# Patient Record
Sex: FEMALE | Race: WHITE | NOT HISPANIC OR LATINO | Employment: OTHER | URBAN - METROPOLITAN AREA
[De-identification: names, ages, dates, MRNs, and addresses within clinical notes are randomized per-mention and may not be internally consistent; named-entity substitution may affect disease eponyms.]

---

## 2021-07-17 ENCOUNTER — APPOINTMENT (OUTPATIENT)
Dept: RADIOLOGY | Facility: MEDICAL CENTER | Age: 66
End: 2021-07-17
Attending: EMERGENCY MEDICINE
Payer: MEDICARE

## 2021-07-17 ENCOUNTER — HOSPITAL ENCOUNTER (OUTPATIENT)
Facility: MEDICAL CENTER | Age: 66
End: 2021-07-19
Attending: EMERGENCY MEDICINE | Admitting: INTERNAL MEDICINE
Payer: MEDICARE

## 2021-07-17 ENCOUNTER — APPOINTMENT (OUTPATIENT)
Dept: RADIOLOGY | Facility: MEDICAL CENTER | Age: 66
End: 2021-07-17
Attending: INTERNAL MEDICINE
Payer: MEDICARE

## 2021-07-17 DIAGNOSIS — I21.4 NSTEMI (NON-ST ELEVATED MYOCARDIAL INFARCTION) (HCC): ICD-10-CM

## 2021-07-17 DIAGNOSIS — R07.9 CHEST PAIN, UNSPECIFIED TYPE: ICD-10-CM

## 2021-07-17 DIAGNOSIS — R79.89 ELEVATED TROPONIN: ICD-10-CM

## 2021-07-17 PROBLEM — I10 HYPERTENSION: Status: ACTIVE | Noted: 2021-07-17

## 2021-07-17 PROBLEM — E11.9 DM (DIABETES MELLITUS) (HCC): Status: ACTIVE | Noted: 2021-07-17

## 2021-07-17 PROBLEM — E03.9 HYPOTHYROIDISM: Status: ACTIVE | Noted: 2021-07-17

## 2021-07-17 LAB
ALBUMIN SERPL BCP-MCNC: 4 G/DL (ref 3.2–4.9)
ALBUMIN/GLOB SERPL: 1.6 G/DL
ALP SERPL-CCNC: 48 U/L (ref 30–99)
ALT SERPL-CCNC: 37 U/L (ref 2–50)
ANION GAP SERPL CALC-SCNC: 15 MMOL/L (ref 7–16)
APTT PPP: 41.8 SEC (ref 24.7–36)
APTT PPP: 46.3 SEC (ref 24.7–36)
AST SERPL-CCNC: 40 U/L (ref 12–45)
BASOPHILS # BLD AUTO: 0.4 % (ref 0–1.8)
BASOPHILS # BLD: 0.03 K/UL (ref 0–0.12)
BILIRUB SERPL-MCNC: 0.3 MG/DL (ref 0.1–1.5)
BUN SERPL-MCNC: 15 MG/DL (ref 8–22)
CALCIUM SERPL-MCNC: 9.4 MG/DL (ref 8.5–10.5)
CHLORIDE SERPL-SCNC: 107 MMOL/L (ref 96–112)
CO2 SERPL-SCNC: 19 MMOL/L (ref 20–33)
CREAT SERPL-MCNC: 0.48 MG/DL (ref 0.5–1.4)
EKG IMPRESSION: NORMAL
EOSINOPHIL # BLD AUTO: 0.22 K/UL (ref 0–0.51)
EOSINOPHIL NFR BLD: 3.1 % (ref 0–6.9)
ERYTHROCYTE [DISTWIDTH] IN BLOOD BY AUTOMATED COUNT: 40.4 FL (ref 35.9–50)
GLOBULIN SER CALC-MCNC: 2.5 G/DL (ref 1.9–3.5)
GLUCOSE BLD-MCNC: 121 MG/DL (ref 65–99)
GLUCOSE BLD-MCNC: 318 MG/DL (ref 65–99)
GLUCOSE SERPL-MCNC: 157 MG/DL (ref 65–99)
HCT VFR BLD AUTO: 40 % (ref 37–47)
HGB BLD-MCNC: 13.8 G/DL (ref 12–16)
IMM GRANULOCYTES # BLD AUTO: 0.02 K/UL (ref 0–0.11)
IMM GRANULOCYTES NFR BLD AUTO: 0.3 % (ref 0–0.9)
INR PPP: 0.96 (ref 0.87–1.13)
INR PPP: 1.01 (ref 0.87–1.13)
LYMPHOCYTES # BLD AUTO: 3.13 K/UL (ref 1–4.8)
LYMPHOCYTES NFR BLD: 43.6 % (ref 22–41)
MAGNESIUM SERPL-MCNC: 1.7 MG/DL (ref 1.5–2.5)
MCH RBC QN AUTO: 30.8 PG (ref 27–33)
MCHC RBC AUTO-ENTMCNC: 34.5 G/DL (ref 33.6–35)
MCV RBC AUTO: 89.3 FL (ref 81.4–97.8)
MONOCYTES # BLD AUTO: 0.46 K/UL (ref 0–0.85)
MONOCYTES NFR BLD AUTO: 6.4 % (ref 0–13.4)
NEUTROPHILS # BLD AUTO: 3.32 K/UL (ref 2–7.15)
NEUTROPHILS NFR BLD: 46.2 % (ref 44–72)
NRBC # BLD AUTO: 0 K/UL
NRBC BLD-RTO: 0 /100 WBC
PLATELET # BLD AUTO: 213 K/UL (ref 164–446)
PMV BLD AUTO: 10.6 FL (ref 9–12.9)
POTASSIUM SERPL-SCNC: 3.8 MMOL/L (ref 3.6–5.5)
PROT SERPL-MCNC: 6.5 G/DL (ref 6–8.2)
PROTHROMBIN TIME: 12.5 SEC (ref 12–14.6)
PROTHROMBIN TIME: 13 SEC (ref 12–14.6)
RBC # BLD AUTO: 4.48 M/UL (ref 4.2–5.4)
SODIUM SERPL-SCNC: 141 MMOL/L (ref 135–145)
TROPONIN T SERPL-MCNC: 38 NG/L (ref 6–19)
TROPONIN T SERPL-MCNC: 41 NG/L (ref 6–19)
UFH PPP CHRO-ACNC: 0.22 IU/ML
UFH PPP CHRO-ACNC: 0.23 IU/ML
UFH PPP CHRO-ACNC: <0.1 IU/ML
WBC # BLD AUTO: 7.2 K/UL (ref 4.8–10.8)

## 2021-07-17 PROCEDURE — 96365 THER/PROPH/DIAG IV INF INIT: CPT

## 2021-07-17 PROCEDURE — 80053 COMPREHEN METABOLIC PANEL: CPT

## 2021-07-17 PROCEDURE — 85730 THROMBOPLASTIN TIME PARTIAL: CPT

## 2021-07-17 PROCEDURE — 71045 X-RAY EXAM CHEST 1 VIEW: CPT

## 2021-07-17 PROCEDURE — 700102 HCHG RX REV CODE 250 W/ 637 OVERRIDE(OP): Performed by: INTERNAL MEDICINE

## 2021-07-17 PROCEDURE — 82962 GLUCOSE BLOOD TEST: CPT

## 2021-07-17 PROCEDURE — 96372 THER/PROPH/DIAG INJ SC/IM: CPT

## 2021-07-17 PROCEDURE — G0378 HOSPITAL OBSERVATION PER HR: HCPCS

## 2021-07-17 PROCEDURE — 85025 COMPLETE CBC W/AUTO DIFF WBC: CPT

## 2021-07-17 PROCEDURE — 99285 EMERGENCY DEPT VISIT HI MDM: CPT

## 2021-07-17 PROCEDURE — 74175 CTA ABDOMEN W/CONTRAST: CPT | Mod: ME

## 2021-07-17 PROCEDURE — 85520 HEPARIN ASSAY: CPT

## 2021-07-17 PROCEDURE — 83735 ASSAY OF MAGNESIUM: CPT

## 2021-07-17 PROCEDURE — 99205 OFFICE O/P NEW HI 60 MIN: CPT | Performed by: INTERNAL MEDICINE

## 2021-07-17 PROCEDURE — 96366 THER/PROPH/DIAG IV INF ADDON: CPT

## 2021-07-17 PROCEDURE — 85610 PROTHROMBIN TIME: CPT

## 2021-07-17 PROCEDURE — 93005 ELECTROCARDIOGRAM TRACING: CPT | Performed by: INTERNAL MEDICINE

## 2021-07-17 PROCEDURE — 700117 HCHG RX CONTRAST REV CODE 255: Performed by: INTERNAL MEDICINE

## 2021-07-17 PROCEDURE — 99220 PR INITIAL OBSERVATION CARE,LEVL III: CPT | Performed by: INTERNAL MEDICINE

## 2021-07-17 PROCEDURE — 700111 HCHG RX REV CODE 636 W/ 250 OVERRIDE (IP): Performed by: EMERGENCY MEDICINE

## 2021-07-17 PROCEDURE — 93005 ELECTROCARDIOGRAM TRACING: CPT | Performed by: EMERGENCY MEDICINE

## 2021-07-17 PROCEDURE — A9270 NON-COVERED ITEM OR SERVICE: HCPCS | Performed by: INTERNAL MEDICINE

## 2021-07-17 PROCEDURE — 84484 ASSAY OF TROPONIN QUANT: CPT

## 2021-07-17 RX ORDER — PROMETHAZINE HYDROCHLORIDE 25 MG/1
12.5-25 TABLET ORAL EVERY 4 HOURS PRN
Status: DISCONTINUED | OUTPATIENT
Start: 2021-07-17 | End: 2021-07-19 | Stop reason: HOSPADM

## 2021-07-17 RX ORDER — CLONIDINE HYDROCHLORIDE 0.1 MG/1
0.1 TABLET ORAL EVERY 6 HOURS PRN
Status: DISCONTINUED | OUTPATIENT
Start: 2021-07-17 | End: 2021-07-19 | Stop reason: HOSPADM

## 2021-07-17 RX ORDER — CLONAZEPAM 2 MG/1
1 TABLET ORAL
COMMUNITY

## 2021-07-17 RX ORDER — HEPARIN SODIUM 1000 [USP'U]/ML
30 INJECTION, SOLUTION INTRAVENOUS; SUBCUTANEOUS PRN
Status: DISCONTINUED | OUTPATIENT
Start: 2021-07-17 | End: 2021-07-18

## 2021-07-17 RX ORDER — LABETALOL HYDROCHLORIDE 5 MG/ML
10 INJECTION, SOLUTION INTRAVENOUS EVERY 4 HOURS PRN
Status: DISCONTINUED | OUTPATIENT
Start: 2021-07-17 | End: 2021-07-19 | Stop reason: HOSPADM

## 2021-07-17 RX ORDER — OXYCODONE HYDROCHLORIDE 10 MG/1
10 TABLET ORAL
Status: DISCONTINUED | OUTPATIENT
Start: 2021-07-17 | End: 2021-07-19 | Stop reason: HOSPADM

## 2021-07-17 RX ORDER — OMEPRAZOLE 20 MG/1
20 CAPSULE, DELAYED RELEASE ORAL 2 TIMES DAILY
Status: DISCONTINUED | OUTPATIENT
Start: 2021-07-17 | End: 2021-07-19 | Stop reason: HOSPADM

## 2021-07-17 RX ORDER — AMOXICILLIN 250 MG
2 CAPSULE ORAL 2 TIMES DAILY
Status: DISCONTINUED | OUTPATIENT
Start: 2021-07-17 | End: 2021-07-19 | Stop reason: HOSPADM

## 2021-07-17 RX ORDER — CLONAZEPAM 1 MG/1
1 TABLET ORAL 2 TIMES DAILY PRN
Status: DISCONTINUED | OUTPATIENT
Start: 2021-07-17 | End: 2021-07-19 | Stop reason: HOSPADM

## 2021-07-17 RX ORDER — ONDANSETRON 4 MG/1
4 TABLET, ORALLY DISINTEGRATING ORAL EVERY 4 HOURS PRN
Status: DISCONTINUED | OUTPATIENT
Start: 2021-07-17 | End: 2021-07-19 | Stop reason: HOSPADM

## 2021-07-17 RX ORDER — CLONAZEPAM 1 MG/1
1 TABLET ORAL
Status: DISCONTINUED | OUTPATIENT
Start: 2021-07-17 | End: 2021-07-19 | Stop reason: HOSPADM

## 2021-07-17 RX ORDER — ACETAMINOPHEN 325 MG/1
650 TABLET ORAL EVERY 6 HOURS PRN
Status: DISCONTINUED | OUTPATIENT
Start: 2021-07-17 | End: 2021-07-19 | Stop reason: HOSPADM

## 2021-07-17 RX ORDER — POLYETHYLENE GLYCOL 3350 17 G/17G
1 POWDER, FOR SOLUTION ORAL
Status: DISCONTINUED | OUTPATIENT
Start: 2021-07-17 | End: 2021-07-19 | Stop reason: HOSPADM

## 2021-07-17 RX ORDER — DEXTROSE MONOHYDRATE 25 G/50ML
50 INJECTION, SOLUTION INTRAVENOUS
Status: DISCONTINUED | OUTPATIENT
Start: 2021-07-17 | End: 2021-07-19 | Stop reason: HOSPADM

## 2021-07-17 RX ORDER — ATORVASTATIN CALCIUM 40 MG/1
40 TABLET, FILM COATED ORAL EVERY EVENING
Status: DISCONTINUED | OUTPATIENT
Start: 2021-07-17 | End: 2021-07-18

## 2021-07-17 RX ORDER — BISACODYL 10 MG
10 SUPPOSITORY, RECTAL RECTAL
Status: DISCONTINUED | OUTPATIENT
Start: 2021-07-17 | End: 2021-07-19 | Stop reason: HOSPADM

## 2021-07-17 RX ORDER — VENLAFAXINE HYDROCHLORIDE 37.5 MG/1
37.5 CAPSULE, EXTENDED RELEASE ORAL
Status: DISCONTINUED | OUTPATIENT
Start: 2021-07-18 | End: 2021-07-19 | Stop reason: HOSPADM

## 2021-07-17 RX ORDER — THYROID 30 MG/1
60 TABLET ORAL
Status: DISCONTINUED | OUTPATIENT
Start: 2021-07-17 | End: 2021-07-19 | Stop reason: HOSPADM

## 2021-07-17 RX ORDER — CARVEDILOL 6.25 MG/1
6.25 TABLET ORAL 2 TIMES DAILY WITH MEALS
Status: DISCONTINUED | OUTPATIENT
Start: 2021-07-17 | End: 2021-07-19

## 2021-07-17 RX ORDER — INSULIN LISPRO 100 [IU]/ML
3-14 INJECTION, SOLUTION INTRAVENOUS; SUBCUTANEOUS
Status: DISCONTINUED | OUTPATIENT
Start: 2021-07-17 | End: 2021-07-19 | Stop reason: HOSPADM

## 2021-07-17 RX ORDER — ENALAPRILAT 1.25 MG/ML
1.25 INJECTION INTRAVENOUS EVERY 6 HOURS PRN
Status: DISCONTINUED | OUTPATIENT
Start: 2021-07-17 | End: 2021-07-19 | Stop reason: HOSPADM

## 2021-07-17 RX ORDER — CHOLECALCIFEROL (VITAMIN D3) 125 MCG
1000 CAPSULE ORAL DAILY
Status: DISCONTINUED | OUTPATIENT
Start: 2021-07-17 | End: 2021-07-19 | Stop reason: HOSPADM

## 2021-07-17 RX ORDER — HEPARIN SODIUM 5000 [USP'U]/100ML
0-30 INJECTION, SOLUTION INTRAVENOUS CONTINUOUS
Status: DISCONTINUED | OUTPATIENT
Start: 2021-07-17 | End: 2021-07-18

## 2021-07-17 RX ORDER — VITAMIN B COMPLEX
1000 TABLET ORAL DAILY
COMMUNITY

## 2021-07-17 RX ORDER — PROCHLORPERAZINE EDISYLATE 5 MG/ML
5-10 INJECTION INTRAMUSCULAR; INTRAVENOUS EVERY 4 HOURS PRN
Status: DISCONTINUED | OUTPATIENT
Start: 2021-07-17 | End: 2021-07-19 | Stop reason: HOSPADM

## 2021-07-17 RX ORDER — LISINOPRIL 20 MG/1
20 TABLET ORAL DAILY
Status: ON HOLD | COMMUNITY
End: 2021-07-19

## 2021-07-17 RX ORDER — MORPHINE SULFATE 4 MG/ML
4 INJECTION, SOLUTION INTRAMUSCULAR; INTRAVENOUS
Status: DISCONTINUED | OUTPATIENT
Start: 2021-07-17 | End: 2021-07-19 | Stop reason: HOSPADM

## 2021-07-17 RX ORDER — CLONAZEPAM 2 MG/1
1 TABLET ORAL 2 TIMES DAILY PRN
COMMUNITY

## 2021-07-17 RX ORDER — PROMETHAZINE HYDROCHLORIDE 25 MG/1
12.5-25 SUPPOSITORY RECTAL EVERY 4 HOURS PRN
Status: DISCONTINUED | OUTPATIENT
Start: 2021-07-17 | End: 2021-07-19 | Stop reason: HOSPADM

## 2021-07-17 RX ORDER — OXYCODONE HYDROCHLORIDE 5 MG/1
5 TABLET ORAL
Status: DISCONTINUED | OUTPATIENT
Start: 2021-07-17 | End: 2021-07-19 | Stop reason: HOSPADM

## 2021-07-17 RX ORDER — NIACIN 500 MG
500 TABLET ORAL DAILY
COMMUNITY

## 2021-07-17 RX ORDER — INSULIN LISPRO 100 [IU]/ML
0.2 INJECTION, SOLUTION INTRAVENOUS; SUBCUTANEOUS
Status: DISCONTINUED | OUTPATIENT
Start: 2021-07-18 | End: 2021-07-19 | Stop reason: HOSPADM

## 2021-07-17 RX ORDER — LISINOPRIL 10 MG/1
10 TABLET ORAL
Status: DISCONTINUED | OUTPATIENT
Start: 2021-07-17 | End: 2021-07-19

## 2021-07-17 RX ORDER — ONDANSETRON 2 MG/ML
4 INJECTION INTRAMUSCULAR; INTRAVENOUS EVERY 4 HOURS PRN
Status: DISCONTINUED | OUTPATIENT
Start: 2021-07-17 | End: 2021-07-19 | Stop reason: HOSPADM

## 2021-07-17 RX ADMIN — CYANOCOBALAMIN TAB 500 MCG 1000 MCG: 500 TAB at 18:37

## 2021-07-17 RX ADMIN — CLONAZEPAM 1 MG: 1 TABLET ORAL at 20:55

## 2021-07-17 RX ADMIN — HEPARIN SODIUM 12 UNITS/KG/HR: 5000 INJECTION, SOLUTION INTRAVENOUS at 15:44

## 2021-07-17 RX ADMIN — INSULIN LISPRO 10 UNITS: 100 INJECTION, SOLUTION INTRAVENOUS; SUBCUTANEOUS at 21:01

## 2021-07-17 RX ADMIN — LISINOPRIL 10 MG: 10 TABLET ORAL at 18:48

## 2021-07-17 RX ADMIN — CARVEDILOL 6.25 MG: 6.25 TABLET, FILM COATED ORAL at 18:37

## 2021-07-17 RX ADMIN — IOHEXOL 100 ML: 350 INJECTION, SOLUTION INTRAVENOUS at 17:51

## 2021-07-17 ASSESSMENT — ENCOUNTER SYMPTOMS
FEVER: 0
HEADACHES: 0
TREMORS: 0
ORTHOPNEA: 0
NECK PAIN: 1
BACK PAIN: 0
FLANK PAIN: 0
SHORTNESS OF BREATH: 1
SENSORY CHANGE: 1
SPEECH CHANGE: 0
BLURRED VISION: 0
NAUSEA: 0
NERVOUS/ANXIOUS: 0
HEMOPTYSIS: 0
COUGH: 1
VOMITING: 0
HEARTBURN: 0
DOUBLE VISION: 0
PHOTOPHOBIA: 0
FOCAL WEAKNESS: 0
SPUTUM PRODUCTION: 0
PALPITATIONS: 0
WEIGHT LOSS: 0
HALLUCINATIONS: 0
CHILLS: 0
POLYDIPSIA: 0
BRUISES/BLEEDS EASILY: 0

## 2021-07-17 ASSESSMENT — LIFESTYLE VARIABLES
ON A TYPICAL DAY WHEN YOU DRINK ALCOHOL HOW MANY DRINKS DO YOU HAVE: 0
TOTAL SCORE: 0
CONSUMPTION TOTAL: NEGATIVE
TOTAL SCORE: 0
TOTAL SCORE: 0
EVER HAD A DRINK FIRST THING IN THE MORNING TO STEADY YOUR NERVES TO GET RID OF A HANGOVER: NO
AVERAGE NUMBER OF DAYS PER WEEK YOU HAVE A DRINK CONTAINING ALCOHOL: 0
HAVE PEOPLE ANNOYED YOU BY CRITICIZING YOUR DRINKING: NO
ALCOHOL_USE: NO
SUBSTANCE_ABUSE: 0
HOW MANY TIMES IN THE PAST YEAR HAVE YOU HAD 5 OR MORE DRINKS IN A DAY: 0
HAVE YOU EVER FELT YOU SHOULD CUT DOWN ON YOUR DRINKING: NO
EVER FELT BAD OR GUILTY ABOUT YOUR DRINKING: NO

## 2021-07-17 ASSESSMENT — PAIN DESCRIPTION - PAIN TYPE
TYPE: CHRONIC PAIN
TYPE: CHRONIC PAIN

## 2021-07-17 ASSESSMENT — PATIENT HEALTH QUESTIONNAIRE - PHQ9
2. FEELING DOWN, DEPRESSED, IRRITABLE, OR HOPELESS: NOT AT ALL
1. LITTLE INTEREST OR PLEASURE IN DOING THINGS: NOT AT ALL
SUM OF ALL RESPONSES TO PHQ9 QUESTIONS 1 AND 2: 0

## 2021-07-17 ASSESSMENT — FIBROSIS 4 INDEX: FIB4 SCORE: 1.98

## 2021-07-17 NOTE — H&P
Hospital Medicine History & Physical Note    Date of Service  7/17/2021    Primary Care Physician  Pcp Pt States None    Consultants  Cardiology    Specialist Names: Dr. Millard    Code Status  Full    Chief Complaint  Chief Complaint   Patient presents with   • Chest Pain       History of Presenting Illness  Twyla Coronado is a 65 y.o. female who presented 7/17/2021 as a transfer from [facility] with non-STEMI  Patient has past medical history of type 2 diabetes, on insulin, hypothyroidism, hereditary hemochromatosis, depression with anxiety, chronic pain syndrome, irritable bowel syndrome, hypertension,.  She presented at outside facility with complaints of pressure-like sensation in the middle of the chest since last night, radiating to the left arm and neck, associated with shortness of breath, numbness on the right face.  Numbness in the face going on and off over 1 week.  According to the patient, she has been having dull pain in the lower part of the middle of the chest radiating to the back for over 1 months without alleviating aggravating factors, which she thought is peptic ulcer disease exacerbation.   last night she started having chest pressure on top of that pain, which is new, without alleviating or aggravating factors..  When she woke up out of pain at night, she noted blood pressure 200/100 and took lisinopril.  She also noted blood sugar elevation 506, took 45 units of short-acting insulin.     At outside facility patient found to have T wave flattening on EKG, troponin elevation 0.21. Vitals stable with blood pressure 124/75, 93% saturation on room air. There is no significant abnormalities in CBC. Chemistry panel significant for AST 46, ALT 42, glucose 207, otherwise unremarkable. Lipase 207, magnesium 1.8, troponin 0 0.216, proBNP 158, TSH 3.95, D-dimer 0.39, patient was started on IV heparin and given aspirin 81 mg. Upon arrival to this emergency department patient has minimal  residual pain in the chest. Troponin CA 38. EKG showed some diffuse T wave flattening. According to ERP, Dr. Kebede, she consulted with cardiology Dr. Millard, recommendations pending    I discussed the plan of care with patient.    Review of Systems  Review of Systems   Constitutional: Negative for chills, fever and weight loss.   HENT: Negative for ear pain, hearing loss and tinnitus.    Eyes: Negative for blurred vision, double vision and photophobia.   Respiratory: Positive for cough (Occasional, dry) and shortness of breath. Negative for hemoptysis and sputum production.    Cardiovascular: Positive for chest pain. Negative for palpitations and orthopnea.   Gastrointestinal: Negative for heartburn, nausea and vomiting.   Genitourinary: Negative for dysuria, flank pain, frequency and hematuria.   Musculoskeletal: Positive for joint pain and neck pain. Negative for back pain.   Skin: Negative for itching and rash.   Neurological: Positive for sensory change. Negative for tremors, speech change, focal weakness and headaches.   Endo/Heme/Allergies: Negative for environmental allergies and polydipsia. Does not bruise/bleed easily.   Psychiatric/Behavioral: Negative for hallucinations and substance abuse. The patient is not nervous/anxious.        Past Medical History  Diabetes on insulin  Depression, anxiety  Chronic pain,  Hypothyroidism  Hereditary hemochromatosis    Surgical History  Carpal tunnel release, cervical fusion, laminectomy, mastoidectomy, LENIN and BSO in     Family History  Father  of heart attack at 50s     Social History  Denies drinking, smoking or using recreational drugs.    Allergies  Allergies   Allergen Reactions   • Ciprofloxacin    • Latex    • Levaquin    • Penicillins        Medications  Prior to Admission Medications   Prescriptions Last Dose Informant Patient Reported? Taking?   L-METHYLFOLATE PO 2021 at 0900 Patient Yes Yes   Sig: Take 1 tablet by mouth every day.   SELENIUM  PO 7/17/2021 at 0900 Patient Yes Yes   Sig: Take 1 capsule by mouth every day.   clonazepam (KLONOPIN) 2 MG tablet 7/16/2021 at 1300 Patient Yes Yes   Sig: Take 1 mg by mouth 2 times a day as needed. Indications: Feeling Anxious   clonazepam (KLONOPIN) 2 MG tablet 7/16/2021 at 2230 Patient Yes Yes   Sig: Take 1 mg by mouth at bedtime.   cyanocobalamin (VITAMIN B12) 1000 MCG Tab 7/17/2021 at 0900 Patient Yes Yes   Sig: Take 1,000 mcg by mouth every day.   insulin aspart (NOVOLOG) 100 UNIT/ML Solution unknown at unknown Patient Yes Yes   Sig: Inject 2-12 Units under the skin 3 times a day as needed for High Blood Sugar (meals). 151-200 = 2 units  201-250 = 4 units  251-300 = 6 units  301-350 = 8 units  351-400 = 10 units  > 400 = 12 units   insulin detemir (LEVEMIR) 100 UNIT/ML Solution 7/17/2021 at 0800 Patient Yes Yes   Sig: Inject 55 Units under the skin 2 times a day.   lisinopril (PRINIVIL) 20 MG Tab unknown at unknown Patient Yes Yes   Sig: Take 20 mg by mouth every day.   metFORMIN (GLUCOPHAGE) 500 MG Tab unknown at unknown Patient Yes Yes   Sig: Take 500 mg by mouth 2 times a day with meals.   niacin 500 MG Tab 7/17/2021 at 0900 Patient Yes Yes   Sig: Take 500 mg by mouth every day.   vitamin D (CHOLECALCIFEROL) 1000 Unit (25 mcg) Tab 7/17/2021 at 0900 Patient Yes Yes   Sig: Take 1,000 Units by mouth every day.      Facility-Administered Medications: None       Physical Exam  Temp:  [36.6 °C (97.9 °F)] 36.6 °C (97.9 °F)  Pulse:  [79-81] 80  Resp:  [21-22] 21  BP: (117-139)/(55-94) 117/55  SpO2:  [93 %-96 %] 94 %    Physical Exam  Vitals and nursing note reviewed.   Constitutional:       General: She is not in acute distress.     Appearance: Normal appearance.   HENT:      Head: Normocephalic and atraumatic.      Nose: Nose normal.      Mouth/Throat:      Mouth: Mucous membranes are moist.   Eyes:      Extraocular Movements: Extraocular movements intact.      Pupils: Pupils are equal, round, and reactive to  light.   Cardiovascular:      Rate and Rhythm: Normal rate and regular rhythm.      Heart sounds: Murmur heard.   Systolic murmur is present.     Pulmonary:      Effort: Pulmonary effort is normal.      Breath sounds: Normal breath sounds.   Chest:      Chest wall: Tenderness present.       Abdominal:      General: Abdomen is flat. There is no distension.      Tenderness: There is abdominal tenderness in the epigastric area. There is no guarding or rebound.   Musculoskeletal:         General: No swelling or deformity. Normal range of motion.      Cervical back: Normal range of motion and neck supple.   Skin:     General: Skin is warm and dry.   Neurological:      General: No focal deficit present.      Mental Status: She is alert and oriented to person, place, and time.   Psychiatric:         Mood and Affect: Mood normal.         Behavior: Behavior normal.         Laboratory:  Recent Labs     07/17/21  1449   WBC 7.2   RBC 4.48   HEMOGLOBIN 13.8   HEMATOCRIT 40.0   MCV 89.3   MCH 30.8   MCHC 34.5   RDW 40.4   PLATELETCT 213   MPV 10.6     Recent Labs     07/17/21  1449   SODIUM 141   POTASSIUM 3.8   CHLORIDE 107   CO2 19*   GLUCOSE 157*   BUN 15   CREATININE 0.48*   CALCIUM 9.4     Recent Labs     07/17/21  1449   ALTSGPT 37   ASTSGOT 40   ALKPHOSPHAT 48   TBILIRUBIN 0.3   GLUCOSE 157*     Recent Labs     07/17/21  1524   APTT 41.8*   INR 0.96     No results for input(s): NTPROBNP in the last 72 hours.      Recent Labs     07/17/21  1449   TROPONINT 38*       Imaging:  DX-CHEST-PORTABLE (1 VIEW)   Final Result      No acute cardiac or pulmonary abnormalities are identified.          X-Ray:  I have personally reviewed the images and compared with prior images.    Assessment/Plan:  I anticipate this patient is appropriate for observation status at this time.    Pain in the chest  Assessment & Plan  Patient breasted with pressure-like chest pain, elevated troponin 38, T wave flattening diffusely on EKG.  She report  chest pain radiating to the back follow-up 1 month sent elevated blood pressure.  Ordered CT of thoracoabdominal aorta to rule out dissection  She has risk factors for CAD, such as family history (father  at 50 from heart attack), diabetes  Status post aspirin and on heparin.  Cardiology/Dr. Millard consulted as per ERP  Continue heparin, admit to telemetry  Repeat EKG troponin  Pain control  Transthoracic echo  PPI twice daily for possible GI etiology    Hypertension  Assessment & Plan  Continue lisinopril, add carvedilol  IV labetalol as needed    Hypothyroidism  Assessment & Plan  Continue home thyroid medication    DM (diabetes mellitus) (HCC)  Assessment & Plan  Continue home dose of long-acting and short-acting insulin  Hypoglycemia protocol      VTE prophylaxis: therapeutic anticoagulation with IV heparin

## 2021-07-17 NOTE — ED TRIAGE NOTES
NSTEMI transfer from [facility].  Midline nonradiating chest pain.  Asa 324mg, versed 0.5mg, heparin bolus and gtt pta.

## 2021-07-17 NOTE — ED PROVIDER NOTES
ED Provider Note    Scribed for Georgiana Kebede M.D. by Joel Salguero. 7/17/2021, 2:58 PM.    Primary care provider: Pcp Pt States None  Means of arrival: EMS  History obtained from: Patient  History limited by: None    CHIEF COMPLAINT  Chief Complaint   Patient presents with   • Chest Pain       HPI  Twyla Coronado is a 65 y.o. female who presents to the Emergency Department via EMS for evaluation of chest pain onset last night at 2 AM. Yesterday morning, the patient woke up with right-sided facial numbness. She has had multiple episodes of right-sided facial numbness in the past. Today was her first time experiencing this chest heaviness. One month ago the patient started feeling pain up her abdomen into her chest. She endorses pain radiating down her arm. She checked her blood pressure and blood sugar which were both elevated. Patient took her medication immediately of lisinopril, aspirin, and insulin. Patient had slurred speech. She admits to associated symptoms of left arm pain, shortness of breath, and lower abdominal pain, but denies back pain. No alleviating factors were reported. She has a history of LVH and murmur.       REVIEW OF SYSTEMS  Pertinent positives include left arm pain, shortness of breath, lower abdominal pain, right-sided facial numbness, and chest pain. Pertinent negatives include no back pain.  All other systems reviewed and negative.    PAST MEDICAL HISTORY       SURGICAL HISTORY  patient denies any surgical history    SOCIAL HISTORY  Social History     Tobacco Use   • Smoking status: Not noted   Substance Use Topics   • Alcohol use: Not noted   • Drug use: Not noted      Social History     Substance and Sexual Activity   Drug Use Not noted       FAMILY HISTORY  History reviewed. No pertinent family history.    CURRENT MEDICATIONS  Home Medications     Reviewed by Thee Patton R.N. (Registered Nurse) on 07/17/21 at 1823  Med List Status: Complete   Medication Last Dose Status  "  clonazepam (KLONOPIN) 2 MG tablet 7/16/2021 Active   clonazepam (KLONOPIN) 2 MG tablet 7/16/2021 Active   cyanocobalamin (VITAMIN B12) 1000 MCG Tab 7/17/2021 Active   insulin aspart (NOVOLOG) 100 UNIT/ML Solution unknown Active   insulin detemir (LEVEMIR) 100 UNIT/ML Solution 7/17/2021 Active   L-METHYLFOLATE PO 7/17/2021 Active   lisinopril (PRINIVIL) 20 MG Tab unknown Active   metFORMIN (GLUCOPHAGE) 500 MG Tab unknown Active   niacin 500 MG Tab 7/17/2021 Active   SELENIUM PO 7/17/2021 Active   vitamin D (CHOLECALCIFEROL) 1000 Unit (25 mcg) Tab 7/17/2021 Active                ALLERGIES  Allergies   Allergen Reactions   • Bee Venom Anaphylaxis   • Dog Epithelium Allergy Skin Test Hives     Severe per pt   Severe per pt      • Ciprofloxacin    • Floxin Otic    • Latex    • Levaquin    • Penicillins        PHYSICAL EXAM  VITAL SIGNS: /94   Pulse 79   Temp 36.6 °C (97.9 °F)   Resp (!) 22   Ht 1.575 m (5' 2\")   Wt 74.8 kg (165 lb)   SpO2 95%   BMI 30.18 kg/m²   Constitutional: Alert in no apparent distress.  HENT: No signs of trauma, Bilateral external ears normal, Nose normal.   Eyes: Pupils are equal and reactive, Conjunctiva normal, Non-icteric.   Neck: Normal range of motion, No tenderness, Supple, No stridor.   Cardiovascular: Regular rate and rhythm, no murmurs.   Thorax & Lungs: Slight reproducible chest pain, Normal breath sounds, No respiratory distress, No wheezing,   Abdomen: Epigastric tenderness, Bowel sounds normal, Soft, No masses, No peritoneal signs.  Skin: Warm, Dry, No erythema, No rash.   Musculoskeletal:  No major deformities noted.   Neurologic: Alert, moving all extremities without difficulty, no focal deficits.    LABS  Labs Reviewed   CBC WITH DIFFERENTIAL - Abnormal; Notable for the following components:       Result Value    Lymphocytes 43.60 (*)     All other components within normal limits   COMP METABOLIC PANEL - Abnormal; Notable for the following components:    Co2 19 (*)  "    Glucose 157 (*)     Creatinine 0.48 (*)     All other components within normal limits   TROPONIN - Abnormal; Notable for the following components:    Troponin T 38 (*)     All other components within normal limits   APTT - Abnormal; Notable for the following components:    APTT 41.8 (*)     All other components within normal limits    Narrative:     Indicate which anticoagulants the patient is on:->HEPARIN   PROTHROMBIN TIME    Narrative:     Indicate which anticoagulants the patient is on:->HEPARIN   HEPARIN XA (UNFRACTIONATED)    Narrative:     Indicate which anticoagulants the patient is on:->HEPARIN   ESTIMATED GFR   MAGNESIUM   TROPONIN   TROPONIN   APTT   PROTHROMBIN TIME   HEPARIN XA (UNFRACTIONATED)     All labs reviewed by me.    EKG  12 Lead EKG interpreted by me     RADIOLOGY  CT-CTA COMPLETE THORACOABDOMINAL AORTA   Final Result      1.  No evidence of aortic aneurysm or dissection.      2.  Extensive atherosclerotic vascular calcification.      3.  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      4.  Fatty liver.                        DX-CHEST-PORTABLE (1 VIEW)   Final Result      No acute cardiac or pulmonary abnormalities are identified.      EC-ECHOCARDIOGRAM COMPLETE W/O CONT    (Results Pending)     The radiologist's interpretation of all radiological studies have been reviewed by me.    COURSE & MEDICAL DECISION MAKING  Pertinent Labs & Imaging studies reviewed. (See chart for details)      2:58 PM - Patient seen and examined at bedside. I informed the patient of plan for hospitalization. Patient will be treated with heparin infusion 25,000 units in 500 mL 0.45% NaCl and heparin injection 1,800 unit. Ordered DX-Chest, aPTT, prothrombin, Heparin Xa, CBC with diff, CMP, Troponin, and EKG to evaluate her symptoms.     4:12 PM Paged Hospitalist.     4:29 PM I discussed the patient's case and the above findings with Dr. Kaiser (Hospitalist) who agrees to evaluate the patient.    Decision  Making:  This is a 65 y.o. year old female who presents with chest pain.  She has a slightly elevated troponin.  She was started on heparin.  Given her story and her risk factors I did she is to continue this at this time.  Troponin here is 38.  EKG does not show any evidence of acute ischemia.  Patient will need hospitalization for repeated troponins and cardiac evaluation.  I did speak with Dr. Millard, cardiology is agreeable to consult.  I also spoke with the hospitalist.  Patient be hospitalized.    DISPOSITION:  Patient will be hospitalized by Dr. Kaiser in guarded condition.      FINAL IMPRESSION  1. Chest pain, unspecified type    2. Elevated troponin         This dictation has been created using voice recognition software and/or scribes. The accuracy of the dictation is limited by the abilities of the software and the expertise of the scribes. I expect there may be some errors of grammar and possibly content. I made every attempt to manually correct the errors within my dictation. However, errors related to voice recognition software and/or scribes may still exist and should be interpreted within the appropriate context.     IJoel (Lilianaibkelsey), am scribing for, and in the presence of, Georgiana Kebede M.D..    Electronically signed by: Joel Salguero (Lilianaibkelsey), 7/17/2021    Georgiana WATTS M.D. personally performed the services described in this documentation, as scribed by Joel Salguero in my presence, and it is both accurate and complete. C    The note accurately reflects work and decisions made by me.  Georgiana Kebede M.D.  7/17/2021  7:01 PM

## 2021-07-17 NOTE — ED NOTES
"Med rec updated and complete. Allergies reviewed.  Met with pt at bedside. Pt denies antibiotic use in last 30 days.  Pt reports that she is not \"good at taking her short acting insulin, metformin or lisinopril.      Home pharmacy [facility]  [phone] [facility]  "

## 2021-07-18 ENCOUNTER — APPOINTMENT (OUTPATIENT)
Dept: CARDIOLOGY | Facility: MEDICAL CENTER | Age: 66
End: 2021-07-18
Attending: INTERNAL MEDICINE
Payer: MEDICARE

## 2021-07-18 PROBLEM — I25.10 CORONARY ARTERY DISEASE INVOLVING NATIVE CORONARY ARTERY: Status: ACTIVE | Noted: 2021-07-18

## 2021-07-18 PROBLEM — I24.9 ACS (ACUTE CORONARY SYNDROME) (HCC): Status: ACTIVE | Noted: 2021-07-17

## 2021-07-18 LAB
ACT BLD: 208 SEC (ref 74–137)
ACT BLD: 246 SEC (ref 74–137)
ACT BLD: 290 SEC (ref 74–137)
ALBUMIN SERPL BCP-MCNC: 3.6 G/DL (ref 3.2–4.9)
ALBUMIN/GLOB SERPL: 1.5 G/DL
ALP SERPL-CCNC: 48 U/L (ref 30–99)
ALT SERPL-CCNC: 40 U/L (ref 2–50)
ANION GAP SERPL CALC-SCNC: 12 MMOL/L (ref 7–16)
AST SERPL-CCNC: 41 U/L (ref 12–45)
BASOPHILS # BLD AUTO: 0.5 % (ref 0–1.8)
BASOPHILS # BLD: 0.03 K/UL (ref 0–0.12)
BILIRUB SERPL-MCNC: 0.2 MG/DL (ref 0.1–1.5)
BUN SERPL-MCNC: 14 MG/DL (ref 8–22)
CALCIUM SERPL-MCNC: 9.3 MG/DL (ref 8.5–10.5)
CHLORIDE SERPL-SCNC: 107 MMOL/L (ref 96–112)
CHOLEST SERPL-MCNC: 244 MG/DL (ref 100–199)
CO2 SERPL-SCNC: 21 MMOL/L (ref 20–33)
CREAT SERPL-MCNC: 0.45 MG/DL (ref 0.5–1.4)
EKG IMPRESSION: NORMAL
EKG IMPRESSION: NORMAL
EOSINOPHIL # BLD AUTO: 0.29 K/UL (ref 0–0.51)
EOSINOPHIL NFR BLD: 4.8 % (ref 0–6.9)
ERYTHROCYTE [DISTWIDTH] IN BLOOD BY AUTOMATED COUNT: 42.4 FL (ref 35.9–50)
EST. AVERAGE GLUCOSE BLD GHB EST-MCNC: 278 MG/DL
GLOBULIN SER CALC-MCNC: 2.4 G/DL (ref 1.9–3.5)
GLUCOSE BLD-MCNC: 174 MG/DL (ref 65–99)
GLUCOSE BLD-MCNC: 277 MG/DL (ref 65–99)
GLUCOSE BLD-MCNC: 297 MG/DL (ref 65–99)
GLUCOSE BLD-MCNC: 300 MG/DL (ref 65–99)
GLUCOSE SERPL-MCNC: 258 MG/DL (ref 65–99)
HBA1C MFR BLD: 11.3 % (ref 4–5.6)
HCT VFR BLD AUTO: 39.3 % (ref 37–47)
HDLC SERPL-MCNC: 40 MG/DL
HGB BLD-MCNC: 13 G/DL (ref 12–16)
IMM GRANULOCYTES # BLD AUTO: 0.01 K/UL (ref 0–0.11)
IMM GRANULOCYTES NFR BLD AUTO: 0.2 % (ref 0–0.9)
LDLC SERPL CALC-MCNC: ABNORMAL MG/DL
LV EJECT FRACT MOD 2C 99903: 74.6
LV EJECT FRACT MOD 4C 99902: 66.23
LV EJECT FRACT MOD BP 99901: 70.62
LYMPHOCYTES # BLD AUTO: 3.21 K/UL (ref 1–4.8)
LYMPHOCYTES NFR BLD: 53.2 % (ref 22–41)
MCH RBC QN AUTO: 30.7 PG (ref 27–33)
MCHC RBC AUTO-ENTMCNC: 33.1 G/DL (ref 33.6–35)
MCV RBC AUTO: 92.9 FL (ref 81.4–97.8)
MONOCYTES # BLD AUTO: 0.33 K/UL (ref 0–0.85)
MONOCYTES NFR BLD AUTO: 5.5 % (ref 0–13.4)
NEUTROPHILS # BLD AUTO: 2.16 K/UL (ref 2–7.15)
NEUTROPHILS NFR BLD: 35.8 % (ref 44–72)
NRBC # BLD AUTO: 0 K/UL
NRBC BLD-RTO: 0 /100 WBC
PLATELET # BLD AUTO: 205 K/UL (ref 164–446)
PMV BLD AUTO: 10.8 FL (ref 9–12.9)
POTASSIUM SERPL-SCNC: 4.2 MMOL/L (ref 3.6–5.5)
PROT SERPL-MCNC: 6 G/DL (ref 6–8.2)
RBC # BLD AUTO: 4.23 M/UL (ref 4.2–5.4)
SODIUM SERPL-SCNC: 140 MMOL/L (ref 135–145)
TRIGL SERPL-MCNC: 801 MG/DL (ref 0–149)
UFH PPP CHRO-ACNC: 0.24 IU/ML
WBC # BLD AUTO: 6 K/UL (ref 4.8–10.8)

## 2021-07-18 PROCEDURE — 700101 HCHG RX REV CODE 250

## 2021-07-18 PROCEDURE — 700102 HCHG RX REV CODE 250 W/ 637 OVERRIDE(OP): Performed by: INTERNAL MEDICINE

## 2021-07-18 PROCEDURE — 700102 HCHG RX REV CODE 250 W/ 637 OVERRIDE(OP)

## 2021-07-18 PROCEDURE — 99214 OFFICE O/P EST MOD 30 MIN: CPT | Mod: 25 | Performed by: INTERNAL MEDICINE

## 2021-07-18 PROCEDURE — 93010 ELECTROCARDIOGRAM REPORT: CPT | Performed by: INTERNAL MEDICINE

## 2021-07-18 PROCEDURE — 85347 COAGULATION TIME ACTIVATED: CPT

## 2021-07-18 PROCEDURE — 700117 HCHG RX CONTRAST REV CODE 255: Performed by: INTERNAL MEDICINE

## 2021-07-18 PROCEDURE — 96366 THER/PROPH/DIAG IV INF ADDON: CPT

## 2021-07-18 PROCEDURE — A9270 NON-COVERED ITEM OR SERVICE: HCPCS | Performed by: NURSE PRACTITIONER

## 2021-07-18 PROCEDURE — 85520 HEPARIN ASSAY: CPT

## 2021-07-18 PROCEDURE — 700105 HCHG RX REV CODE 258: Performed by: INTERNAL MEDICINE

## 2021-07-18 PROCEDURE — 80053 COMPREHEN METABOLIC PANEL: CPT

## 2021-07-18 PROCEDURE — A9270 NON-COVERED ITEM OR SERVICE: HCPCS

## 2021-07-18 PROCEDURE — 700102 HCHG RX REV CODE 250 W/ 637 OVERRIDE(OP): Performed by: NURSE PRACTITIONER

## 2021-07-18 PROCEDURE — 99152 MOD SED SAME PHYS/QHP 5/>YRS: CPT | Performed by: INTERNAL MEDICINE

## 2021-07-18 PROCEDURE — 93010 ELECTROCARDIOGRAM REPORT: CPT | Mod: 59,77 | Performed by: INTERNAL MEDICINE

## 2021-07-18 PROCEDURE — 80061 LIPID PANEL: CPT

## 2021-07-18 PROCEDURE — 92928 PRQ TCAT PLMT NTRAC ST 1 LES: CPT | Mod: 59,LC | Performed by: INTERNAL MEDICINE

## 2021-07-18 PROCEDURE — 93306 TTE W/DOPPLER COMPLETE: CPT

## 2021-07-18 PROCEDURE — 700111 HCHG RX REV CODE 636 W/ 250 OVERRIDE (IP)

## 2021-07-18 PROCEDURE — 99225 PR SUBSEQUENT OBSERVATION CARE,LEVEL II: CPT | Performed by: NURSE PRACTITIONER

## 2021-07-18 PROCEDURE — 85025 COMPLETE CBC W/AUTO DIFF WBC: CPT

## 2021-07-18 PROCEDURE — G0378 HOSPITAL OBSERVATION PER HR: HCPCS

## 2021-07-18 PROCEDURE — 82962 GLUCOSE BLOOD TEST: CPT | Mod: 91

## 2021-07-18 PROCEDURE — 700111 HCHG RX REV CODE 636 W/ 250 OVERRIDE (IP): Performed by: EMERGENCY MEDICINE

## 2021-07-18 PROCEDURE — 93306 TTE W/DOPPLER COMPLETE: CPT | Mod: 26 | Performed by: INTERNAL MEDICINE

## 2021-07-18 PROCEDURE — C1887 CATHETER, GUIDING: HCPCS

## 2021-07-18 PROCEDURE — 83036 HEMOGLOBIN GLYCOSYLATED A1C: CPT

## 2021-07-18 PROCEDURE — 93005 ELECTROCARDIOGRAM TRACING: CPT | Performed by: INTERNAL MEDICINE

## 2021-07-18 PROCEDURE — 96372 THER/PROPH/DIAG INJ SC/IM: CPT

## 2021-07-18 PROCEDURE — 93458 L HRT ARTERY/VENTRICLE ANGIO: CPT | Mod: 26,59 | Performed by: INTERNAL MEDICINE

## 2021-07-18 PROCEDURE — 96375 TX/PRO/DX INJ NEW DRUG ADDON: CPT

## 2021-07-18 PROCEDURE — A9270 NON-COVERED ITEM OR SERVICE: HCPCS | Performed by: INTERNAL MEDICINE

## 2021-07-18 RX ORDER — VERAPAMIL HYDROCHLORIDE 2.5 MG/ML
INJECTION, SOLUTION INTRAVENOUS
Status: COMPLETED
Start: 2021-07-18 | End: 2021-07-18

## 2021-07-18 RX ORDER — ATORVASTATIN CALCIUM 80 MG/1
80 TABLET, FILM COATED ORAL EVERY EVENING
Status: DISCONTINUED | OUTPATIENT
Start: 2021-07-18 | End: 2021-07-19 | Stop reason: HOSPADM

## 2021-07-18 RX ORDER — MIDAZOLAM HYDROCHLORIDE 1 MG/ML
INJECTION INTRAMUSCULAR; INTRAVENOUS
Status: COMPLETED
Start: 2021-07-18 | End: 2021-07-18

## 2021-07-18 RX ORDER — PRASUGREL 10 MG/1
TABLET, FILM COATED ORAL
Status: COMPLETED
Start: 2021-07-18 | End: 2021-07-18

## 2021-07-18 RX ORDER — PRASUGREL 10 MG/1
10 TABLET, FILM COATED ORAL DAILY
Status: DISCONTINUED | OUTPATIENT
Start: 2021-07-19 | End: 2021-07-19 | Stop reason: HOSPADM

## 2021-07-18 RX ORDER — SODIUM CHLORIDE 9 MG/ML
INJECTION, SOLUTION INTRAVENOUS CONTINUOUS
Status: DISCONTINUED | OUTPATIENT
Start: 2021-07-18 | End: 2021-07-18

## 2021-07-18 RX ORDER — HEPARIN SODIUM 1000 [USP'U]/ML
INJECTION, SOLUTION INTRAVENOUS; SUBCUTANEOUS
Status: COMPLETED
Start: 2021-07-18 | End: 2021-07-18

## 2021-07-18 RX ORDER — PRASUGREL 10 MG/1
60 TABLET, FILM COATED ORAL ONCE
Status: DISCONTINUED | OUTPATIENT
Start: 2021-07-18 | End: 2021-07-18

## 2021-07-18 RX ORDER — LIDOCAINE HYDROCHLORIDE 20 MG/ML
INJECTION, SOLUTION INFILTRATION; PERINEURAL
Status: COMPLETED
Start: 2021-07-18 | End: 2021-07-18

## 2021-07-18 RX ORDER — HEPARIN SODIUM 200 [USP'U]/100ML
INJECTION, SOLUTION INTRAVENOUS
Status: COMPLETED
Start: 2021-07-18 | End: 2021-07-18

## 2021-07-18 RX ADMIN — CYANOCOBALAMIN TAB 500 MCG 1000 MCG: 500 TAB at 06:37

## 2021-07-18 RX ADMIN — INSULIN LISPRO 7 UNITS: 100 INJECTION, SOLUTION INTRAVENOUS; SUBCUTANEOUS at 18:36

## 2021-07-18 RX ADMIN — INSULIN LISPRO 7 UNITS: 100 INJECTION, SOLUTION INTRAVENOUS; SUBCUTANEOUS at 21:49

## 2021-07-18 RX ADMIN — OMEPRAZOLE 20 MG: 20 CAPSULE, DELAYED RELEASE ORAL at 18:35

## 2021-07-18 RX ADMIN — LISINOPRIL 10 MG: 10 TABLET ORAL at 06:37

## 2021-07-18 RX ADMIN — ATORVASTATIN CALCIUM 80 MG: 80 TABLET, FILM COATED ORAL at 18:34

## 2021-07-18 RX ADMIN — CLONAZEPAM 1 MG: 1 TABLET ORAL at 12:23

## 2021-07-18 RX ADMIN — ACETAMINOPHEN 650 MG: 325 TABLET, FILM COATED ORAL at 15:10

## 2021-07-18 RX ADMIN — INSULIN GLARGINE 45 UNITS: 100 INJECTION, SOLUTION SUBCUTANEOUS at 19:25

## 2021-07-18 RX ADMIN — VERAPAMIL HYDROCHLORIDE 2.5 MG: 2.5 INJECTION, SOLUTION INTRAVENOUS at 13:16

## 2021-07-18 RX ADMIN — THYROID, PORCINE 60 MG: 30 TABLET ORAL at 06:38

## 2021-07-18 RX ADMIN — HEPARIN SODIUM 18 UNITS/KG/HR: 5000 INJECTION, SOLUTION INTRAVENOUS at 08:31

## 2021-07-18 RX ADMIN — ASPIRIN 81 MG: 81 TABLET, COATED ORAL at 06:37

## 2021-07-18 RX ADMIN — CARVEDILOL 6.25 MG: 6.25 TABLET, FILM COATED ORAL at 18:34

## 2021-07-18 RX ADMIN — HEPARIN SODIUM 16 UNITS/KG/HR: 5000 INJECTION, SOLUTION INTRAVENOUS at 00:33

## 2021-07-18 RX ADMIN — FENTANYL CITRATE 100 MCG: 50 INJECTION, SOLUTION INTRAMUSCULAR; INTRAVENOUS at 13:39

## 2021-07-18 RX ADMIN — HEPARIN SODIUM: 1000 INJECTION, SOLUTION INTRAVENOUS; SUBCUTANEOUS at 13:16

## 2021-07-18 RX ADMIN — LIDOCAINE HYDROCHLORIDE: 20 INJECTION, SOLUTION INFILTRATION; PERINEURAL at 13:16

## 2021-07-18 RX ADMIN — INSULIN LISPRO 3 UNITS: 100 INJECTION, SOLUTION INTRAVENOUS; SUBCUTANEOUS at 15:01

## 2021-07-18 RX ADMIN — HEPARIN SODIUM: 200 INJECTION, SOLUTION INTRAVENOUS at 13:17

## 2021-07-18 RX ADMIN — NITROGLYCERIN 10 ML: 20 INJECTION INTRAVENOUS at 13:16

## 2021-07-18 RX ADMIN — MIDAZOLAM HYDROCHLORIDE 1 MG: 1 INJECTION, SOLUTION INTRAMUSCULAR; INTRAVENOUS at 14:05

## 2021-07-18 RX ADMIN — INSULIN LISPRO 5 UNITS: 100 INJECTION, SOLUTION INTRAVENOUS; SUBCUTANEOUS at 18:47

## 2021-07-18 RX ADMIN — HEPARIN SODIUM 1800 UNITS: 1000 INJECTION, SOLUTION INTRAVENOUS; SUBCUTANEOUS at 00:33

## 2021-07-18 RX ADMIN — PRASUGREL 60 MG: 10 TABLET, FILM COATED ORAL at 14:12

## 2021-07-18 RX ADMIN — CLONAZEPAM 1 MG: 1 TABLET ORAL at 21:49

## 2021-07-18 RX ADMIN — INSULIN LISPRO 7 UNITS: 100 INJECTION, SOLUTION INTRAVENOUS; SUBCUTANEOUS at 09:11

## 2021-07-18 RX ADMIN — INSULIN LISPRO 5 UNITS: 100 INJECTION, SOLUTION INTRAVENOUS; SUBCUTANEOUS at 09:21

## 2021-07-18 RX ADMIN — INSULIN LISPRO 5 UNITS: 100 INJECTION, SOLUTION INTRAVENOUS; SUBCUTANEOUS at 15:06

## 2021-07-18 RX ADMIN — CARVEDILOL 6.25 MG: 6.25 TABLET, FILM COATED ORAL at 09:11

## 2021-07-18 RX ADMIN — HEPARIN SODIUM: 1000 INJECTION, SOLUTION INTRAVENOUS; SUBCUTANEOUS at 13:57

## 2021-07-18 RX ADMIN — SODIUM CHLORIDE: 9 INJECTION, SOLUTION INTRAVENOUS at 06:37

## 2021-07-18 RX ADMIN — IOHEXOL 133 ML: 350 INJECTION, SOLUTION INTRAVENOUS at 13:45

## 2021-07-18 RX ADMIN — MIDAZOLAM HYDROCHLORIDE 2 MG: 1 INJECTION, SOLUTION INTRAMUSCULAR; INTRAVENOUS at 13:31

## 2021-07-18 ASSESSMENT — PAIN DESCRIPTION - PAIN TYPE: TYPE: CHRONIC PAIN

## 2021-07-18 ASSESSMENT — ENCOUNTER SYMPTOMS
STRIDOR: 0
NAUSEA: 0
DIZZINESS: 0
SHORTNESS OF BREATH: 1
CHOKING: 0
SHORTNESS OF BREATH: 0
HEADACHES: 0
COUGH: 0
FEVER: 0
CHEST TIGHTNESS: 0
CHILLS: 0
WHEEZING: 0
SPUTUM PRODUCTION: 0
ABDOMINAL PAIN: 0
WEAKNESS: 0
APNEA: 0
HEMOPTYSIS: 0
VOMITING: 0
WEIGHT LOSS: 0

## 2021-07-18 NOTE — CARE PLAN
Problem: Pain - Standard  Goal: Alleviation of pain or a reduction in pain to the patient’s comfort goal  Outcome: Progressing     Problem: Knowledge Deficit - Standard  Goal: Patient and family/care givers will demonstrate understanding of plan of care, disease process/condition, diagnostic tests and medications  Outcome: Progressing   The patient is Stable - Low risk of patient condition declining or worsening         Progress made toward(s) clinical / shift goals:      Patient is not progressing towards the following goals:

## 2021-07-18 NOTE — ASSESSMENT & PLAN NOTE
Uncontrolled, A1c 11.3%  Continue home dose of long-acting and short-acting insulin  Hypoglycemia protocol

## 2021-07-18 NOTE — PROGRESS NOTES
[facility] Medicine Daily Progress Note    Date of Service  7/18/2021    Chief Complaint  Twyla Coronado is a 65 y.o. female admitted 7/17/2021 with chest pain     Hospital Course  No notes on file    Interval Problem Update  S/p [facility] with 3 vessel stenting. Tolerated procedure well. Patient reports chest pain improved, but still feel slightly SOB. VSS on room air. Afebrile.     I have personally seen and examined the patient at bedside. I discussed the plan of care with patient, bedside RN and charge RN.    Consultants/Specialty  cardiology    Code Status  Full Code    Disposition  Patient is not medically cleared.   Anticipate discharge to to home with close outpatient follow-up.  I have placed the appropriate orders for post-discharge needs.    Review of Systems  Review of Systems   Constitutional: Positive for malaise/fatigue. Negative for chills, fever and weight loss.   Respiratory: Positive for shortness of breath. Negative for cough, hemoptysis, sputum production and wheezing.    Cardiovascular: Positive for chest pain. Negative for leg swelling.   Gastrointestinal: Negative for abdominal pain, nausea and vomiting.   Neurological: Negative for dizziness, weakness and headaches.   All other systems reviewed and are negative.       Physical Exam  Temp:  [36.3 °C (97.3 °F)-37.6 °C (99.7 °F)] 36.9 °C (98.4 °F)  Pulse:  [73-81] 73  Resp:  [14-24] 20  BP: (106-159)/(54-77) 158/67  SpO2:  [93 %-96 %] 96 %    Physical Exam  Vitals and nursing note reviewed.   Constitutional:       General: She is not in acute distress.     Appearance: She is not toxic-appearing.   HENT:      Head: Normocephalic.      Right Ear: External ear normal.      Left Ear: External ear normal.      Nose: Nose normal.      Mouth/Throat:      Mouth: Mucous membranes are moist.      Pharynx: Oropharynx is clear.   Eyes:      Pupils: Pupils are equal, round, and reactive to light.   Cardiovascular:      Rate and Rhythm: Normal rate.   Pulmonary:       Effort: Pulmonary effort is normal.      Breath sounds: Normal breath sounds.   Abdominal:      General: There is distension.      Tenderness: There is no abdominal tenderness. There is no guarding.   Musculoskeletal:      Cervical back: Normal range of motion.      Right lower leg: No edema.      Left lower leg: No edema.   Skin:     General: Skin is warm and dry.   Neurological:      Mental Status: She is alert and oriented to person, place, and time. Mental status is at baseline.   Psychiatric:         Mood and Affect: Mood normal.         Thought Content: Thought content normal.         Judgment: Judgment normal.         Fluids  No intake or output data in the 24 hours ending 07/18/21 1456    Laboratory  Recent Labs     07/17/21  1449 07/18/21  0640   WBC 7.2 6.0   RBC 4.48 4.23   HEMOGLOBIN 13.8 13.0   HEMATOCRIT 40.0 39.3   MCV 89.3 92.9   MCH 30.8 30.7   MCHC 34.5 33.1*   RDW 40.4 42.4   PLATELETCT 213 205   MPV 10.6 10.8     Recent Labs     07/17/21  1449 07/18/21  0640   SODIUM 141 140   POTASSIUM 3.8 4.2   CHLORIDE 107 107   CO2 19* 21   GLUCOSE 157* 258*   BUN 15 14   CREATININE 0.48* 0.45*   CALCIUM 9.4 9.3     Recent Labs     07/17/21  1524 07/17/21  1923   APTT 41.8* 46.3*   INR 0.96 1.01         Recent Labs     07/18/21  0640   TRIGLYCERIDE 801*   HDL 40   LDL see below       Imaging  EC-ECHOCARDIOGRAM COMPLETE W/O CONT   Final Result      CT-CTA COMPLETE THORACOABDOMINAL AORTA   Final Result      1.  No evidence of aortic aneurysm or dissection.      2.  Extensive atherosclerotic vascular calcification.      3.  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      4.  Fatty liver.                        DX-CHEST-PORTABLE (1 VIEW)   Final Result      No acute cardiac or pulmonary abnormalities are identified.      CL-LEFT HEART CATHETERIZATION WITH POSSIBLE INTERVENTION    (Results Pending)        Assessment/Plan  * ACS (acute coronary syndrome) (HCC)- (present on admission)  Assessment & Plan  S/p C  with 3 vessel stenting   Continue ASA and statin   Pain control  Transthoracic echo      Coronary artery disease involving native coronary artery- (present on admission)  Assessment & Plan  Continue high intensity statin     Hypertension  Assessment & Plan  Continue lisinopril, add carvedilol  IV labetalol as needed    Hypothyroidism  Assessment & Plan  Continue home thyroid medication    DM (diabetes mellitus) ([facility])  Assessment & Plan  Uncontrolled, A1c 11.3%  Continue home dose of long-acting and short-acting insulin  Hypoglycemia protocol       VTE prophylaxis: SCDs/TEDs    I have performed a physical exam and reviewed and updated ROS and Plan today (7/18/2021). In review of yesterday's note (7/17/2021), there are no changes except as documented above.

## 2021-07-18 NOTE — CARE PLAN
Problem: Pain - Standard  Goal: Alleviation of pain or a reduction in pain to the patient’s comfort goal  Outcome: Progressing     Problem: Knowledge Deficit - Standard  Goal: Patient and family/care givers will demonstrate understanding of plan of care, disease process/condition, diagnostic tests and medications  Outcome: Progressing   The patient is Stable - Low risk of patient condition declining or worsening    Shift Goals  Clinical Goals: Cath   Patient Goals: Discharge    Progress made toward(s) clinical / shift goals:  patient updated on POC    Patient is not progressing towards the following goals:

## 2021-07-18 NOTE — CONSULTS
Cardiology Initial Consult Note    Date of note:    7/17/2021      Consulting Physician: Trery Kaiser M.D.    Patient ID:    Name:   Twyla Coronado   YOB: 1955  Age:   65 y.o.  female   MRN:   [id number]      Reason for Consultation: chest pain    HPI:  Twyla Coronado is a 65 y.o.-year-old female with a history of diabetes and hypertension who presented with chest pain.     She reports last night she forgot to take her nighttime insulin. Later in the night around 2AM on 7/17 she had acute onset of moderate to severe epigastric pain radiating to her back and down her left arm and associated with SOB. She checked her BP and it was around 205/119. Her blood sugar was up to 508. She presented to [facility] where she was given heparin, aspirin, and versed.  She was then transferred to [facility] for further work-up and evaluation.     Here she is chest pain free. Trop increased from 38 to 41.    Of note, she has had progressively worsening SOB and ECHEVERRIA over the last couple months. She has also had episodes of left facial numbness and dysarthria.Lastly, she reports her local physicians have diagnosed her with porphyria as well as hereditary hemochromatosis. It does not appear she has received treatment for either of these conditions at this time. She has been told she has a heart murmur and so some dentists in the past have refused to do dental work on her.       ROS  Constitution: Negative for chills, fever and night sweats.   HENT: Negative for nosebleeds.    Eyes: Negative for vision loss in left eye and vision loss in right eye.   Respiratory: Negative for hemoptysis.    Gastrointestinal: Negative for hematemesis, hematochezia and melena.   Genitourinary: Negative for hematuria.     + cough, parasthesias, joint and neck pain.      All others reviewed and negative.      History reviewed. No pertinent past medical history.    History reviewed. No pertinent surgical history.      Medications Prior  to Admission   Medication Sig Dispense Refill Last Dose   • insulin detemir (LEVEMIR) 100 UNIT/ML Solution Inject 55 Units under the skin 2 times a day.   7/17/2021 at 0800   • insulin aspart (NOVOLOG) 100 UNIT/ML Solution Inject 2-12 Units under the skin 3 times a day as needed for High Blood Sugar (meals). 151-200 = 2 units  201-250 = 4 units  251-300 = 6 units  301-350 = 8 units  351-400 = 10 units  > 400 = 12 units   unknown at unknown   • lisinopril (PRINIVIL) 20 MG Tab Take 20 mg by mouth every day.   unknown at unknown   • metFORMIN (GLUCOPHAGE) 500 MG Tab Take 500 mg by mouth 2 times a day with meals.   unknown at unknown   • clonazepam (KLONOPIN) 2 MG tablet Take 1 mg by mouth 2 times a day as needed. Indications: Feeling Anxious   7/16/2021 at 1300   • clonazepam (KLONOPIN) 2 MG tablet Take 1 mg by mouth at bedtime.   7/16/2021 at 2230   • vitamin D (CHOLECALCIFEROL) 1000 Unit (25 mcg) Tab Take 1,000 Units by mouth every day.   7/17/2021 at 0900   • cyanocobalamin (VITAMIN B12) 1000 MCG Tab Take 1,000 mcg by mouth every day.   7/17/2021 at 0900   • L-METHYLFOLATE PO Take 1 tablet by mouth every day.   7/17/2021 at 0900   • SELENIUM PO Take 1 capsule by mouth every day.   7/17/2021 at 0900   • niacin 500 MG Tab Take 500 mg by mouth every day.   7/17/2021 at 0900           Allergies   Allergen Reactions   • Bee Venom Anaphylaxis   • Dog Epithelium Allergy Skin Test Hives     Severe per pt   Severe per pt      • Ciprofloxacin    • Floxin Otic    • Latex    • Levaquin    • Penicillins          Family History   Problem Relation Age of Onset   • Heart Disease Brother          Social History     Socioeconomic History   • Marital status: Unknown     Spouse name: Not on file   • Number of children: Not on file   • Years of education: Not on file   • Highest education level: Not on file   Occupational History   • Not on file   Tobacco Use   • Smoking status: Former Smoker   • Smokeless tobacco: Never Used   Vaping  "Use   • Vaping Use: Never used   Substance and Sexual Activity   • Alcohol use: Never   • Drug use: Yes     Types: Marijuana   • Sexual activity: Not on file   Other Topics Concern   • Not on file   Social History Narrative   • Not on file     Social Determinants of Health     Financial Resource Strain:    • Difficulty of Paying Living Expenses:    Food Insecurity:    • Worried About Running Out of Food in the Last Year:    • Ran Out of Food in the Last Year:    Transportation Needs:    • Lack of Transportation (Medical):    • Lack of Transportation (Non-Medical):    Physical Activity:    • Days of Exercise per Week:    • Minutes of Exercise per Session:    Stress:    • Feeling of Stress :    Social Connections:    • Frequency of Communication with Friends and Family:    • Frequency of Social Gatherings with Friends and Family:    • Attends Congregational Services:    • Active Member of Clubs or Organizations:    • Attends Club or Organization Meetings:    • Marital Status:    Intimate Partner Violence:    • Fear of Current or Ex-Partner:    • Emotionally Abused:    • Physically Abused:    • Sexually Abused:          Physical Exam  Body mass index is 30.08 kg/m².  /70   Pulse 81   Temp 37.1 °C (98.7 °F) (Temporal)   Resp 16   Ht 1.575 m (5' 2\")   Wt 74.6 kg (164 lb 7.4 oz)   SpO2 95%   Vitals:    07/17/21 1615 07/17/21 1700 07/17/21 1809 07/17/21 1905   BP:  110/59 159/77 137/70   Pulse: 81 78 81    Resp: (!) 21 (!) 24  16   Temp:   37.6 °C (99.7 °F) 37.1 °C (98.7 °F)   TempSrc:   Temporal Temporal   SpO2: 93% 93% 95%    Weight:   74.6 kg (164 lb 7.4 oz)    Height:   1.575 m (5' 2\")      Oxygen Therapy:  Pulse Oximetry: 95 %, O2 (LPM): 0, O2 Delivery Device: Room air w/o2 available    General: No apparent distress  Eyes: nl conjunctiva  ENT: OP clear.  Neck: JVP 6-7 cm H2O, no carotid bruits  Lungs: normal respiratory effort, CTAB  Heart: RRR, 2/6 systolic murmur, no rubs or gallops, trace edema bilateral " lower extremities. No LV/RV heave on cardiac palpatation. 2+ bilateral radial pulses.  2+ bilateral dp pulses.   Abdomen: soft, non tender, non distended, no masses, normal bowel sounds.  No HSM.  Extremities/MSK: no clubbing, no cyanosis  Neurological: No focal sensory deficits  Psychiatric: Appropriate affect, A/O x 3  Skin: Warm extremities        Labs (personally reviewed and notable for):   Trop 38      Cardiac Imaging and Procedures Review:    EKG dated 7/17/2021: My personal interpretation is NSR, non-specific st changes      Radiology test Review:  CTA aorta 7/17/2021:  IMPRESSION:     1.  No evidence of aortic aneurysm or dissection.     2.  Extensive atherosclerotic vascular calcification.     3.  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     4.  Fatty liver.      Impression and Medical Decision Making:  # NSTEMI, severe coronary artery calcifications.   # Diabetes  # Hypothyroidism  # Hypertensive emergency  # Celiac Artery Stenosis      Recommendations:  # Given her typical angina, her poorly controlled diabetes, and her progressive symptoms, I did discuss with her proceeding directly to cardiac catheterization.   # aspirin, heparin gtt.  # she is reluctant to start statin medications, however should she have obstructive CAD she did say she would be willing to consider taking them. She has not had a reaction to them, she has just heard they are bad to take. We discussed at length their use and importance.   # continue coreg, lisinopril, can uptitrate or add norvasc.   # NPO at midnight  # DAPT to be started during cath if necessary  # Echo      Thank you for allowing me to participate in the care of this patient, Cardiology will continue to follow.  Please contact me with any questions.      Obed Millard MD  Cardiologist, [facility]   [phone]

## 2021-07-18 NOTE — PROGRESS NOTES
Assessment completed. Pt A&Ox 4. Respirations are even and unlabored on room air. Pt reports pain at this time. Monitors applied, VS stable, call light and belongings within reach. POC updated (Cath). Pt educated on room and call light, pt verbalized understanding. Communication board updated. Needs met.

## 2021-07-18 NOTE — PROGRESS NOTES
Assessment completed. Pt A&Ox 4. Respirations are even and unlabored on room air. Pt reports pain at this time. Monitors applied, VS stable, call light and belongings within reach. POC updated (Monitor). Pt educated on room and call light, pt verbalized understanding. Communication board updated. Needs met.

## 2021-07-18 NOTE — PROCEDURES
Cardiac Catheterization and Percutaneous Intervention Procedure Report    2021    Referring MD: Dr. Millard    Indication for procedure: ACS <24 hours    Procedures:  · Insertion of 5/6 FR sheath in the right radial artery  · right and left coronary arteriograms  · Left heart catheterization and Left ventriculogram  · Angioplasty and placement of a 3.5 by 15mm, 3.0X8 mm  [place] drug-eluting stents in mid  left anterior descending coronary artery.  · Angioplasty and placement of a 2.5 by 12 mm Rick drug-eluting  stent in proximal first obtuse marginal coronary artery.  · Angioplasty and placement of a 2.75 by 12 mm [place] drug-eluting  stent in proximal posterolateral branch of right coronary artery.    Final diagnosis:   three vessel coronary artery disease.  Successful percutaneous intervention of left anterior descending, marginal branch of left circumflex and posterolateral branch of right  coronary artery.    Recommendations: Guideline directed medical therapy and risk factor management      Coronary arteriograms:  Left main: normal  Left anterior descendin% disease in proximal to mid RCA. Two diagonal branches are noted.  Left circumflex: Diffuse mild disease. Major first marginal branch has 80% stenosis in proximal portion.  Right coronary: mild disease in proximal RCA, proximal posterolateral branch has 90% focal lesion, dominant    Left Heart Catheterization:  Left Ventriculogram: ejection fraction 65%  Left Ventricular EDP: 30 mm Hg   Aortic Valve Gradient: No significant AV gradient noted    Procedure details:  Twyla Coronado was brought to the cardiac catheterization lab where the right wrist was prepped and draped in the usual manner for cardiac catheterization.  The area was anesthetized with lidocaine and a 5/6 FR sheath was inserted into the right radial artery without difficulty. A #3.5 left Rebecca catheter was advanced to the ostia of the Left coronary artery and arteriograms were recorded.   A  "#4 right Rebecca catheter was advanced to the ostia of the right coronary artery and arteriograms were recorded. Aortic valve was crossed using #4 right Rebecca catheter left heart catheterization and left ventriculogram were performed.  Patient underwent percutaneous coronary revascularization as outlined below.  At the completion of the case the sheath was removed and hemostasis achieved utilizing a radial compression band .  Patient was pain-free and hemodynamically stable at the completion of the case.  There were no apparent complications.    Interventional Procedure LAD:     Patient has low syntax score and focal proximal lesions, she is also blood less. So I elected to perform multivessel PCI.    Indication for PCI:  New onset Angina < 2 months    Pre: 90 %, 15 mm length, RAMESH 3 flow  Post: 0%, RAMESH 3 flow    Lesion complexity  High  Severe calcification No  Bifurcation  No    Guide catheter: EBU 3.0 was advanced to the ostia of the left coronary artery.    Guide wire: A 0.014\" mm  Runthrough was advanced into the artery and crossed the lesion.    Balloon pre-dilatation: 3.0 by 12 mm Emerge inflated to 10 SERGO to pre-dilate the lesion.    Stent: A 3.5 by 15 mm Rick drug-eluting  stent was deployed in proximal  left anterior descending coronary artery at 12 SERGO. There is plaque shift to distal stent edge.    Additional stent: A 3.0 by 8 mm Rick drug-eluting  stent was deployed in mid  left anterior descending coronary artery at 12 SERGO.      Interventional Procedure marginal:     Pre: 80 %, 5 mm length, RAMESH 3 flow  Post: 0%, RAMESH 3 flow    Lesion complexity  Non-High  Severe calcification No  Bifurcation  No    Guide wire: A 0.014\" mm  Runthrough was advanced into the artery and crossed the lesion.    Stent: A 2.5 by 12 mm [place] drug-eluting  stent was deployed in proximal  obtuse marginal coronary artery at 12 SERGO.    Post dilatation is achieved using  2.5 by 8 mm NC Emergeinflated to 14 SERGO.    Interventional " "Procedure RCA:     Pre: 90 %, 5 mm length, RAMESH 3 flow  Post: 0%, RAMESH 3 flow    Lesion complexity  Non-High  Severe calcification No  Bifurcation  yes    Guide catheter: JR 4 was advanced to the ostia of the right coronary artery.    Guide wire: A 0.014\" mm  Runthrough was advanced into the artery and crossed the lesion.      Stent: A 2.75 by 12 mm Rick drug-eluting  stent was deployed in proximal  posterolateral coronary artery at 12 SERGO.    There was some plaque shift into ostial PDA, which was treated with angioplasty using same stent balloon at 7 atms.    Anticoagulant: Heparin  Antiplatelet: Prasugrel  EBL <25 cc  Complications: none  Specimens: none  Contrast: 133cc  Fluorotime : see cath lab flowsheet      Sedation: I supervised moderate sedation over a trained independent observer.    Sedation start time: 13:13  End time: 14:06    Electronically signed by   Mckay Duong M.D., LISA  Interventional cardiologist  7/18/2021  2:15 PM            "

## 2021-07-18 NOTE — PROGRESS NOTES
Cardiology Follow Up Progress Note    Date of Service  7/18/2021    Attending Physician  CANDY Pritchard*    Presented with chest pain, transferred from [facility] with NSTEMI    Cardiology consultation for NSTEMI    PMH: Type 2 diabetes, on insulin, hypertension, hypothyroid, hereditary hemochromatosis, depression with anxiety, chronic pain syndrome, irritable bowel syndrome.      Interim Events    No overnight cardiac issues  Telemetry-SR  S/p PTCA/PCI mid LAD, PTCA/PCI OM1, PTCA/PCI PLB of RCA 7/20/2021.  Labs reviewed  A1c 11.3  Triglycerides 801  Total cholesterol 244  Start of high intensity statin, patient is agreeable  Troponin peaked at 41  /67      Review of Systems  Review of Systems   Respiratory: Negative for apnea, cough, choking, chest tightness, shortness of breath, wheezing and stridor.        Vital signs in last 24 hours  Temp:  [36.3 °C (97.3 °F)-37.6 °C (99.7 °F)] 36.9 °C (98.4 °F)  Pulse:  [73-81] 73  Resp:  [14-24] 20  BP: (106-159)/(54-94) 158/67  SpO2:  [93 %-96 %] 96 %    Physical Exam  Physical Exam  Cardiovascular:      Rate and Rhythm: Normal rate and regular rhythm.   Pulmonary:      Effort: Pulmonary effort is normal.   Skin:     General: Skin is warm.      Comments: TR band intact, good circulation   Neurological:      Mental Status: She is alert. Mental status is at baseline.   Psychiatric:         Mood and Affect: Mood normal.         Lab Review  Lab Results   Component Value Date/Time    WBC 6.0 07/18/2021 06:40 AM    RBC 4.23 07/18/2021 06:40 AM    HEMOGLOBIN 13.0 07/18/2021 06:40 AM    HEMATOCRIT 39.3 07/18/2021 06:40 AM    MCV 92.9 07/18/2021 06:40 AM    MCH 30.7 07/18/2021 06:40 AM    MCHC 33.1 (L) 07/18/2021 06:40 AM    MPV 10.8 07/18/2021 06:40 AM      Lab Results   Component Value Date/Time    SODIUM 140 07/18/2021 06:40 AM    POTASSIUM 4.2 07/18/2021 06:40 AM    CHLORIDE 107 07/18/2021 06:40 AM    CO2 21 07/18/2021 06:40 AM    GLUCOSE 258 (H) 07/18/2021 06:40  AM    BUN 14 07/18/2021 06:40 AM    CREATININE 0.45 (L) 07/18/2021 06:40 AM      Lab Results   Component Value Date/Time    ASTSGOT 41 07/18/2021 06:40 AM    ALTSGPT 40 07/18/2021 06:40 AM     Lab Results   Component Value Date/Time    CHOLSTRLTOT 244 (H) 07/18/2021 06:40 AM    LDL see below 07/18/2021 06:40 AM    HDL 40 07/18/2021 06:40 AM    TRIGLYCERIDE 801 (H) 07/18/2021 06:40 AM    TROPONINT 41 (H) 07/17/2021 07:23 PM       No results for input(s): NTPROBNP in the last 72 hours.    Cardiac Imaging and Procedures Review  EKG: SR    Echocardiogram:    7/18/2021  LVEF 65%  No regional wall motion abnormality  Mild aortic stenosis  Transvalvular gradient peak 27 mmHg, pain 6/10  Dimensionless index 0.4    Cardiac Catheterization: 7/18/21    · Angioplasty and placement of a 3.5 by 15mm, 3.0X8 mm  Rick drug-eluting stents in mid  left anterior descending coronary artery.  · Angioplasty and placement of a 2.5 by 12 mm Rick drug-eluting  stent in proximal first obtuse marginal coronary artery.  · Angioplasty and placement of a 2.75 by 12 mm Rick drug-eluting  stent in proximal posterolateral branch of right coronary artery.    Imaging  Chest X-Ray: No acute cardiopulmonary abnormality identified    Stress Test:  N/a      CTA chest 7/70/21  No evidence of aortic aneurysm or dissection  Extensive atherosclerotic vascular calcification  Severe stenosis of the celiac artery  Fatty liver    Assessment/Plan    NSTEMI  -S/p successful PTCA/PCI/IDES/ of LAD, OM1 of LCx & PLB of RCA, 7/18/21.  -DAPT asa , effient , along with atorvastatin 80, carvedilol 6.25 BID, lisinopril 10.        Hypertensive emergency  -Takes lisinopril at home  -Continue with lisinopril , add carvedilol uptitrate for target blood pressure <130/80     Type 2 diabetes  -Poorly controlled  -A1c 11.3  -Patient reports unable to control BS due to recurrent sinus infections secondary to home situation (mold).  She has plans to move out of the current home  situation.  -On insulin.    Dyslipidemia  -High intensity statin, started    Hypertriglyceridemia, 801  A1c 11.3  -Elevated triglycerides levels are independently associated with atherosclerotic cardiovascular disease.  -Excellent glycemic control in patients with diabetes should be first-line therapy.  -If triglycerides persistently above 885 then recommendation is adding fenofibrate to statin.    Celiac artery stenosis  -asymptomatic    Cardiology will follow along    Thank you for allowing me to participate in the care of this patient.      Please contact me with any questions.    MARLON Gan.   Cardiologist, [facility]  [phone]

## 2021-07-19 ENCOUNTER — PHARMACY VISIT (OUTPATIENT)
Dept: PHARMACY | Facility: MEDICAL CENTER | Age: 66
End: 2021-07-19
Payer: COMMERCIAL

## 2021-07-19 VITALS
WEIGHT: 164.46 LBS | RESPIRATION RATE: 18 BRPM | HEART RATE: 76 BPM | SYSTOLIC BLOOD PRESSURE: 153 MMHG | BODY MASS INDEX: 30.26 KG/M2 | DIASTOLIC BLOOD PRESSURE: 68 MMHG | HEIGHT: 62 IN | TEMPERATURE: 98.1 F | OXYGEN SATURATION: 95 %

## 2021-07-19 PROBLEM — I24.9 ACS (ACUTE CORONARY SYNDROME) (HCC): Status: RESOLVED | Noted: 2021-07-17 | Resolved: 2021-07-19

## 2021-07-19 LAB
ANION GAP SERPL CALC-SCNC: 11 MMOL/L (ref 7–16)
BUN SERPL-MCNC: 13 MG/DL (ref 8–22)
CALCIUM SERPL-MCNC: 8.9 MG/DL (ref 8.5–10.5)
CHLORIDE SERPL-SCNC: 105 MMOL/L (ref 96–112)
CO2 SERPL-SCNC: 21 MMOL/L (ref 20–33)
CREAT SERPL-MCNC: 0.46 MG/DL (ref 0.5–1.4)
ERYTHROCYTE [DISTWIDTH] IN BLOOD BY AUTOMATED COUNT: 42.4 FL (ref 35.9–50)
GLUCOSE BLD-MCNC: 192 MG/DL (ref 65–99)
GLUCOSE SERPL-MCNC: 222 MG/DL (ref 65–99)
HCT VFR BLD AUTO: 34.8 % (ref 37–47)
HGB BLD-MCNC: 11.8 G/DL (ref 12–16)
MCH RBC QN AUTO: 31.4 PG (ref 27–33)
MCHC RBC AUTO-ENTMCNC: 33.9 G/DL (ref 33.6–35)
MCV RBC AUTO: 92.6 FL (ref 81.4–97.8)
PLATELET # BLD AUTO: 182 K/UL (ref 164–446)
PMV BLD AUTO: 11.1 FL (ref 9–12.9)
POTASSIUM SERPL-SCNC: 3.7 MMOL/L (ref 3.6–5.5)
RBC # BLD AUTO: 3.76 M/UL (ref 4.2–5.4)
SODIUM SERPL-SCNC: 137 MMOL/L (ref 135–145)
WBC # BLD AUTO: 5.4 K/UL (ref 4.8–10.8)

## 2021-07-19 PROCEDURE — 700102 HCHG RX REV CODE 250 W/ 637 OVERRIDE(OP): Performed by: NURSE PRACTITIONER

## 2021-07-19 PROCEDURE — A9270 NON-COVERED ITEM OR SERVICE: HCPCS | Performed by: INTERNAL MEDICINE

## 2021-07-19 PROCEDURE — 82962 GLUCOSE BLOOD TEST: CPT

## 2021-07-19 PROCEDURE — 80048 BASIC METABOLIC PNL TOTAL CA: CPT

## 2021-07-19 PROCEDURE — 99214 OFFICE O/P EST MOD 30 MIN: CPT | Performed by: INTERNAL MEDICINE

## 2021-07-19 PROCEDURE — 99217 PR OBSERVATION CARE DISCHARGE: CPT | Performed by: NURSE PRACTITIONER

## 2021-07-19 PROCEDURE — 700102 HCHG RX REV CODE 250 W/ 637 OVERRIDE(OP): Performed by: INTERNAL MEDICINE

## 2021-07-19 PROCEDURE — A9270 NON-COVERED ITEM OR SERVICE: HCPCS | Performed by: NURSE PRACTITIONER

## 2021-07-19 PROCEDURE — 96372 THER/PROPH/DIAG INJ SC/IM: CPT

## 2021-07-19 PROCEDURE — RXMED WILLOW AMBULATORY MEDICATION CHARGE: Performed by: NURSE PRACTITIONER

## 2021-07-19 PROCEDURE — 97162 PT EVAL MOD COMPLEX 30 MIN: CPT

## 2021-07-19 PROCEDURE — G0378 HOSPITAL OBSERVATION PER HR: HCPCS

## 2021-07-19 PROCEDURE — 85027 COMPLETE CBC AUTOMATED: CPT

## 2021-07-19 RX ORDER — ATORVASTATIN CALCIUM 80 MG/1
80 TABLET, FILM COATED ORAL EVERY EVENING
Qty: 30 TABLET | Refills: 0 | Status: SHIPPED | OUTPATIENT
Start: 2021-07-19

## 2021-07-19 RX ORDER — LISINOPRIL 10 MG/1
10 TABLET ORAL ONCE
Status: COMPLETED | OUTPATIENT
Start: 2021-07-19 | End: 2021-07-19

## 2021-07-19 RX ORDER — PRASUGREL 10 MG/1
10 TABLET, FILM COATED ORAL DAILY
Qty: 30 TABLET | Refills: 0 | Status: SHIPPED | OUTPATIENT
Start: 2021-07-20 | End: 2021-07-19

## 2021-07-19 RX ORDER — OMEPRAZOLE 20 MG/1
20 CAPSULE, DELAYED RELEASE ORAL DAILY
Qty: 30 CAPSULE | Refills: 0 | Status: SHIPPED | OUTPATIENT
Start: 2021-07-19

## 2021-07-19 RX ORDER — CARVEDILOL 12.5 MG/1
12.5 TABLET ORAL 2 TIMES DAILY WITH MEALS
Qty: 60 TABLET | Refills: 0 | Status: SHIPPED | OUTPATIENT
Start: 2021-07-19

## 2021-07-19 RX ORDER — LISINOPRIL 20 MG/1
20 TABLET ORAL
Status: DISCONTINUED | OUTPATIENT
Start: 2021-07-20 | End: 2021-07-19 | Stop reason: HOSPADM

## 2021-07-19 RX ORDER — ASPIRIN 81 MG/1
81 TABLET ORAL DAILY
Qty: 30 TABLET | Refills: 0 | Status: SHIPPED | OUTPATIENT
Start: 2021-07-20

## 2021-07-19 RX ORDER — CARVEDILOL 12.5 MG/1
12.5 TABLET ORAL 2 TIMES DAILY WITH MEALS
Status: DISCONTINUED | OUTPATIENT
Start: 2021-07-19 | End: 2021-07-19 | Stop reason: HOSPADM

## 2021-07-19 RX ORDER — LISINOPRIL 20 MG/1
20 TABLET ORAL DAILY
Qty: 30 TABLET | Refills: 0 | Status: SHIPPED | OUTPATIENT
Start: 2021-07-20

## 2021-07-19 RX ORDER — CARVEDILOL 6.25 MG/1
6.25 TABLET ORAL ONCE
Status: COMPLETED | OUTPATIENT
Start: 2021-07-19 | End: 2021-07-19

## 2021-07-19 RX ADMIN — VENLAFAXINE HYDROCHLORIDE 37.5 MG: 37.5 CAPSULE, EXTENDED RELEASE ORAL at 08:27

## 2021-07-19 RX ADMIN — CYANOCOBALAMIN TAB 500 MCG 1000 MCG: 500 TAB at 06:05

## 2021-07-19 RX ADMIN — INSULIN LISPRO 5 UNITS: 100 INJECTION, SOLUTION INTRAVENOUS; SUBCUTANEOUS at 08:30

## 2021-07-19 RX ADMIN — PRASUGREL 10 MG: 10 TABLET, FILM COATED ORAL at 06:05

## 2021-07-19 RX ADMIN — THYROID, PORCINE 60 MG: 30 TABLET ORAL at 06:05

## 2021-07-19 RX ADMIN — ASPIRIN 81 MG: 81 TABLET, COATED ORAL at 06:05

## 2021-07-19 RX ADMIN — CARVEDILOL 6.25 MG: 6.25 TABLET, FILM COATED ORAL at 12:11

## 2021-07-19 RX ADMIN — LISINOPRIL 10 MG: 10 TABLET ORAL at 12:11

## 2021-07-19 RX ADMIN — CARVEDILOL 6.25 MG: 6.25 TABLET, FILM COATED ORAL at 08:27

## 2021-07-19 RX ADMIN — OMEPRAZOLE 20 MG: 20 CAPSULE, DELAYED RELEASE ORAL at 06:05

## 2021-07-19 RX ADMIN — INSULIN LISPRO 3 UNITS: 100 INJECTION, SOLUTION INTRAVENOUS; SUBCUTANEOUS at 08:31

## 2021-07-19 RX ADMIN — LISINOPRIL 10 MG: 10 TABLET ORAL at 06:05

## 2021-07-19 ASSESSMENT — GAIT ASSESSMENTS
DISTANCE (FEET): 500
GAIT LEVEL OF ASSIST: SUPERVISED

## 2021-07-19 ASSESSMENT — COGNITIVE AND FUNCTIONAL STATUS - GENERAL
MOBILITY SCORE: 24
SUGGESTED CMS G CODE MODIFIER MOBILITY: CH

## 2021-07-19 NOTE — PROGRESS NOTES
DC to home, will follow up cardiologist in [place], gave information Cardiologist in [place].

## 2021-07-19 NOTE — PROGRESS NOTES
Monitor summary per monitor tech report:    sr Rhythm rate 77-88  Pvc, pac ectopy  .13/.08/.32    Unable to upload tele strip

## 2021-07-19 NOTE — THERAPY
Physical Therapy   Cardiac Rehab Initial Evaluation     Patient Name: Twyla Coronado  Age:  65 y.o., Sex:  female  Medical Record #: [id number]  Today's Date: 7/19/2021     Precautions: Cardiac Precautions (ACS)    Assessment  Patient is 65 y.o. female with c/o chest pain now s/p [facility] with 3-artery PCI. Pt educated on risk factor modification for CAD. She acknowledges a need to modify diet and add exercise. She was able to amb x5' at RPE scale 11 which increase HR from . Provided pt with handout regarding increasing activity safely. No further PT needs at this time.    Plan    Recommend Physical Therapy for Evaluation only     DC Equipment Recommendations:  None  Discharge Recommendations: Anticipate that the patient will have no further physical therapy needs after discharge from the hospital     Objective     07/19/21 0909   Prior Living Situation   Prior Services None   Housing / Facility 1 Story House   Equipment Owned None   Lives with - Patient's Self Care Capacity Adult Children   Comments Lives with son whom is present at time of eval   Prior Level of Functional Mobility   Bed Mobility Independent   Transfer Status Independent   Ambulation Independent   Distance Ambulation (Feet) (Limited community distances 2/2 DM)   Assistive Devices Used None   Strength Lower Body   Lower Body Strength  WDL   Balance Assessment   Sitting Balance (Static) Good   Sitting Balance (Dynamic) Good   Standing Balance (Static) Good   Standing Balance (Dynamic) Good   Gait Analysis   Gait Level Of Assist Supervised   Assistive Device None   Distance (Feet) 500   Weight Bearing Status FWB   Comments Pt with HR up to 100 x5' of amb at RPE scale 11

## 2021-07-19 NOTE — DISCHARGE PLANNING
Meds-to-Beds: Discharge prescription orders listed below delivered to patient's bedside. RN notified. Patient counseled.      Per DAPHNIE Chavez Dr. To would like patient to begin Brilinta tomorrow morning. Patient and RN Rubina notified.        Twyla Coronado   Home Medication Instructions ZAKI:[id number]    Printed on:07/19/21 8566   Medication Information                      aspirin 81 MG EC tablet  Take 1 tablet by mouth every day.             atorvastatin (LIPITOR) 80 MG tablet  Take 1 tablet by mouth every evening.             carvedilol (COREG) 12.5 MG Tab  Take 1 tablet by mouth 2 times a day with meals.             lisinopril (PRINIVIL) 20 MG Tab  Take 1 tablet by mouth every day.             omeprazole (PRILOSEC) 20 MG delayed-release capsule  Take 1 capsule by mouth every day.             ticagrelor (BRILINTA) 90 MG Tab tablet  Take 1 tablet by mouth 2 times a day.               Mile Reynolds, PharmD

## 2021-07-19 NOTE — DISCHARGE INSTRUCTIONS
Discharge Instructions    Discharged to home by car with relative. Discharged via walking, hospital escort: Yes.  Special equipment needed: Not Applicable    Be sure to schedule a follow-up appointment with your primary care doctor or any specialists as instructed.     Discharge Plan:   Diet Plan: Discussed  Activity Level: Discussed  Confirmed Follow up Appointment: Patient to Call and Schedule Appointment  Confirmed Symptoms Management: Discussed  Medication Reconciliation Updated: Yes    I understand that a diet low in cholesterol, fat, and sodium is recommended for good health. Unless I have been given specific instructions below for another diet, I accept this instruction as my diet prescription.   Other diet: Diabetic Cardiac Diet    Special Instructions: None    · Is patient discharged on Warfarin / Coumadin?   No     Depression / Suicide Risk    As you are discharged from this [facility] facility, it is important to learn how to keep safe from harming yourself.    Recognize the warning signs:  · Abrupt changes in personality, positive or negative- including increase in energy   · Giving away possessions  · Change in eating patterns- significant weight changes-  positive or negative  · Change in sleeping patterns- unable to sleep or sleeping all the time   · Unwillingness or inability to communicate  · Depression  · Unusual sadness, discouragement and loneliness  · Talk of wanting to die  · Neglect of personal appearance   · Rebelliousness- reckless behavior  · Withdrawal from people/activities they love  · Confusion- inability to concentrate     If you or a loved one observes any of these behaviors or has concerns about self-harm, here's what you can do:  · Talk about it- your feelings and reasons for harming yourself  · Remove any means that you might use to hurt yourself (examples: pills, rope, extension cords, firearm)  · Get professional help from the community (Mental Health, Substance Abuse,  psychological counseling)  · Do not be alone:Call your Safe Contact- someone whom you trust who will be there for you.  · Call your local CRISIS HOTLINE 466-0939 or 447-703-5277  · Call your local Children's Mobile Crisis Response Team [place] (317) 059-4963 or www.[employer]  · Call the toll free National Suicide Prevention Hotlines   · National Suicide Prevention Lifeline 199-623-BQBX (4871)  · National Hope Line Network 800-SUICIDE (552-8223)

## 2021-07-19 NOTE — CARE PLAN
Problem: Pain - Standard  Goal: Alleviation of pain or a reduction in pain to the patient’s comfort goal  Outcome: Progressing     Problem: Knowledge Deficit - Standard  Goal: Patient and family/care givers will demonstrate understanding of plan of care, disease process/condition, diagnostic tests and medications  Outcome: Progressing   The patient is Stable - Low risk of patient condition declining or worsening    Shift Goals  Clinical Goals: Cath   Patient Goals: Discharge    Progress made toward(s) clinical / shift goals:      Patient is not progressing towards the following goals:

## 2021-07-19 NOTE — PROGRESS NOTES
Seen by cardiologist and clear for DC , order for Meds to bed. Family member at bedside and education done.

## 2021-07-19 NOTE — DISCHARGE SUMMARY
Discharge Summary    CHIEF COMPLAINT ON ADMISSION  Chief Complaint   Patient presents with   • Chest Pain       Reason for Admission  Transfer     Admission Date  7/17/2021    CODE STATUS  Full Code    HPI & HOSPITAL COURSE  This is a 65 y.o. female here with NSTEMI. Cardiology consulted on admission. She was started on heparin gtt and taken to cath lab the following day. LHC revealed extensive CAD requiring 3 stents. She was started on DAPT,statin, Coreg, and lisinopril per cardiology recommendations. She was monitored overnight for blood pressure control and medication tolerability. Blood pressure still on the high end the following morning, so Coreg and Lisinopril were increased upon discharge. Patient denied any c/o chest pain and doing well on room air. She was cleared for discharge by cardiology with instructions to follow up with PCP and cardiologist outpatient.     Therefore, she is discharged in fair and stable condition to home with close outpatient follow-up.    The patient recovered much more quickly than anticipated on admission.    Discharge Date  7/19/2021    FOLLOW UP ITEMS POST DISCHARGE  N/A    DISCHARGE DIAGNOSES  Principal Problem (Resolved):    ACS (acute coronary syndrome) ([facility]) POA: Yes  Active Problems:    DM (diabetes mellitus) ([facility]) POA: Unknown    Hypothyroidism POA: Unknown    Hypertension POA: Unknown    Coronary artery disease involving native coronary artery POA: Yes      FOLLOW UP  No future appointments.  No follow-up provider specified.    MEDICATIONS ON DISCHARGE     Medication List      START taking these medications      Instructions   aspirin 81 MG EC tablet  Start taking on: July 20, 2021   Take 1 tablet by mouth every day.  Dose: 81 mg     atorvastatin 80 MG tablet  Commonly known as: LIPITOR   Take 1 tablet by mouth every evening.  Dose: 80 mg     carvedilol 12.5 MG Tabs  Commonly known as: COREG   Take 1 tablet by mouth 2 times a day with meals.  Dose: 12.5 mg     omeprazole  20 MG delayed-release capsule  Commonly known as: PRILOSEC   Take 1 capsule by mouth every day.  Dose: 20 mg     prasugrel 10 MG Tabs  Start taking on: July 20, 2021  Commonly known as: EFFIENT   Take 1 tablet by mouth every day.  Dose: 10 mg        CONTINUE taking these medications      Instructions   * clonazepam 2 MG tablet  Commonly known as: KLONOPIN   Take 1 mg by mouth 2 times a day as needed. Indications: Feeling Anxious  Dose: 1 mg     * clonazepam 2 MG tablet  Commonly known as: KLONOPIN   Take 1 mg by mouth at bedtime.  Dose: 1 mg     cyanocobalamin 1000 MCG Tabs  Commonly known as: VITAMIN B12   Take 1,000 mcg by mouth every day.  Dose: 1,000 mcg     insulin aspart 100 UNIT/ML Soln  Commonly known as: NovoLOG   Inject 2-12 Units under the skin 3 times a day as needed for High Blood Sugar (meals). 151-200 = 2 units  201-250 = 4 units  251-300 = 6 units  301-350 = 8 units  351-400 = 10 units  > 400 = 12 units  Dose: 2-12 Units     L-METHYLFOLATE PO   Take 1 tablet by mouth every day.  Dose: 1 tablet     Levemir 100 UNIT/ML Soln  Generic drug: insulin detemir   Inject 55 Units under the skin 2 times a day.  Dose: 55 Units     lisinopril 20 MG Tabs  Start taking on: July 20, 2021  Commonly known as: PRINIVIL   Take 1 tablet by mouth every day.  Dose: 20 mg     metFORMIN 500 MG Tabs  Commonly known as: GLUCOPHAGE   Take 500 mg by mouth 2 times a day with meals.  Dose: 500 mg     niacin 500 MG Tabs   Take 500 mg by mouth every day.  Dose: 500 mg     SELENIUM PO   Take 1 capsule by mouth every day.  Dose: 1 capsule     vitamin D 1000 Unit (25 mcg) Tabs  Commonly known as: cholecalciferol   Take 1,000 Units by mouth every day.  Dose: 1,000 Units         * This list has 2 medication(s) that are the same as other medications prescribed for you. Read the directions carefully, and ask your doctor or other care provider to review them with you.                Allergies  Allergies   Allergen Reactions   • Bee Venom  Anaphylaxis   • Dog Epithelium Allergy Skin Test Hives     Severe per pt   Severe per pt      • Bloodless    • Ciprofloxacin    • Floxin Otic    • Latex    • Levaquin    • Penicillins        DIET  Orders Placed This Encounter   Procedures   • Diet Order Diet: Cardiac     Standing Status:   Standing     Number of Occurrences:   1     Order Specific Question:   Diet:     Answer:   Cardiac [6]       ACTIVITY  As tolerated.  Weight bearing as tolerated    CONSULTATIONS  Cardiology , Dr. Millard     PROCEDURES  7/18/2021: cardiac catheterization with 3 vessel stenting     LABORATORY  Lab Results   Component Value Date    SODIUM 137 07/19/2021    POTASSIUM 3.7 07/19/2021    CHLORIDE 105 07/19/2021    CO2 21 07/19/2021    GLUCOSE 222 (H) 07/19/2021    BUN 13 07/19/2021    CREATININE 0.46 (L) 07/19/2021        Lab Results   Component Value Date    WBC 5.4 07/19/2021    HEMOGLOBIN 11.8 (L) 07/19/2021    HEMATOCRIT 34.8 (L) 07/19/2021    PLATELETCT 182 07/19/2021